# Patient Record
Sex: FEMALE | Race: WHITE | Employment: UNEMPLOYED | URBAN - METROPOLITAN AREA
[De-identification: names, ages, dates, MRNs, and addresses within clinical notes are randomized per-mention and may not be internally consistent; named-entity substitution may affect disease eponyms.]

---

## 2019-03-25 PROBLEM — N18.30 CHRONIC KIDNEY DISEASE, STAGE 3 (MODERATE): Status: ACTIVE | Noted: 2019-03-25

## 2019-03-26 PROBLEM — E78.2 MIXED HYPERLIPIDEMIA: Status: ACTIVE | Noted: 2019-03-26

## 2019-03-26 PROBLEM — Z79.4 TYPE 2 DIABETES MELLITUS WITH STAGE 1 CHRONIC KIDNEY DISEASE, WITH LONG-TERM CURRENT USE OF INSULIN (HCC): Status: ACTIVE | Noted: 2019-03-26

## 2019-03-26 PROBLEM — I10 ESSENTIAL HYPERTENSION: Status: ACTIVE | Noted: 2019-03-26

## 2019-03-26 PROBLEM — E11.22 TYPE 2 DIABETES MELLITUS WITH STAGE 1 CHRONIC KIDNEY DISEASE, WITH LONG-TERM CURRENT USE OF INSULIN (HCC): Status: ACTIVE | Noted: 2019-03-26

## 2019-03-26 PROBLEM — E03.9 HYPOTHYROIDISM: Status: ACTIVE | Noted: 2019-03-26

## 2019-03-26 PROBLEM — N18.1 TYPE 2 DIABETES MELLITUS WITH STAGE 1 CHRONIC KIDNEY DISEASE, WITH LONG-TERM CURRENT USE OF INSULIN (HCC): Status: ACTIVE | Noted: 2019-03-26

## 2019-03-26 PROBLEM — N39.3 STRESS INCONTINENCE: Status: ACTIVE | Noted: 2019-03-26

## 2020-04-13 PROBLEM — L03.90 CELLULITIS: Status: ACTIVE | Noted: 2020-04-06

## 2020-05-08 PROBLEM — T24.732A: Status: ACTIVE | Noted: 2020-05-08

## 2020-05-14 ENCOUNTER — APPOINTMENT (OUTPATIENT)
Dept: WOUND CARE | Facility: HOSPITAL | Age: 65
End: 2020-05-14
Attending: NURSE PRACTITIONER

## 2021-03-17 DIAGNOSIS — Z23 NEED FOR VACCINATION: ICD-10-CM

## 2021-05-20 PROBLEM — H91.92 HEARING LOSS OF LEFT EAR: Status: ACTIVE | Noted: 2021-05-20

## 2023-05-02 ENCOUNTER — APPOINTMENT (OUTPATIENT)
Dept: CT IMAGING | Facility: HOSPITAL | Age: 68
End: 2023-05-02
Attending: EMERGENCY MEDICINE
Payer: MEDICARE

## 2023-05-02 ENCOUNTER — APPOINTMENT (OUTPATIENT)
Dept: INTERVENTIONAL RADIOLOGY/VASCULAR | Facility: HOSPITAL | Age: 68
End: 2023-05-02
Attending: INTERNAL MEDICINE
Payer: MEDICARE

## 2023-05-02 ENCOUNTER — HOSPITAL ENCOUNTER (INPATIENT)
Facility: HOSPITAL | Age: 68
LOS: 8 days | Discharge: HOME | End: 2023-05-10
Attending: EMERGENCY MEDICINE
Payer: MEDICARE

## 2023-05-02 ENCOUNTER — HOSPITAL ENCOUNTER (INPATIENT)
Facility: HOSPITAL | Age: 68
LOS: 8 days | Discharge: HOME | End: 2023-05-10
Attending: EMERGENCY MEDICINE | Admitting: EMERGENCY MEDICINE
Payer: MEDICARE

## 2023-05-02 DIAGNOSIS — I21.4 NSTEMI (NON-ST ELEVATED MYOCARDIAL INFARCTION) (HCC): Primary | ICD-10-CM

## 2023-05-02 LAB
ALBUMIN SERPL-MCNC: 3.9 G/DL (ref 3.4–5)
ALP LIVER SERPL-CCNC: 127 U/L
ALT SERPL-CCNC: 26 U/L
ANION GAP SERPL CALC-SCNC: 12 MMOL/L (ref 0–18)
APTT PPP: 23.9 SECONDS (ref 23.3–35.6)
AST SERPL-CCNC: 43 U/L (ref 15–37)
ATRIAL RATE: 88 BPM
ATRIAL RATE: 94 BPM
ATRIAL RATE: 95 BPM
BASOPHILS # BLD AUTO: 0.04 X10(3) UL (ref 0–0.2)
BASOPHILS NFR BLD AUTO: 0.3 %
BILIRUB DIRECT SERPL-MCNC: 0.4 MG/DL (ref 0–0.2)
BILIRUB SERPL-MCNC: 0.8 MG/DL (ref 0.1–2)
BUN BLD-MCNC: 27 MG/DL (ref 7–18)
BUN/CREAT SERPL: 14.9 (ref 10–20)
CALCIUM BLD-MCNC: 9.6 MG/DL (ref 8.5–10.1)
CHLORIDE SERPL-SCNC: 106 MMOL/L (ref 98–112)
CHOLEST SERPL-MCNC: 98 MG/DL (ref ?–200)
CO2 SERPL-SCNC: 20 MMOL/L (ref 21–32)
CREAT BLD-MCNC: 1.81 MG/DL
DEPRECATED RDW RBC AUTO: 41.6 FL (ref 35.1–46.3)
EOSINOPHIL # BLD AUTO: 0.05 X10(3) UL (ref 0–0.7)
EOSINOPHIL NFR BLD AUTO: 0.4 %
ERYTHROCYTE [DISTWIDTH] IN BLOOD BY AUTOMATED COUNT: 13.1 % (ref 11–15)
EST. AVERAGE GLUCOSE BLD GHB EST-MCNC: 180 MG/DL (ref 68–126)
GFR SERPLBLD BASED ON 1.73 SQ M-ARVRAT: 31 ML/MIN/1.73M2 (ref 60–?)
GLUCOSE BLD-MCNC: 190 MG/DL (ref 70–99)
GLUCOSE BLDC GLUCOMTR-MCNC: 125 MG/DL (ref 70–99)
GLUCOSE BLDC GLUCOMTR-MCNC: 217 MG/DL (ref 70–99)
GLUCOSE BLDC GLUCOMTR-MCNC: 236 MG/DL (ref 70–99)
HBA1C MFR BLD: 7.9 % (ref ?–5.7)
HCT VFR BLD AUTO: 46.1 %
HDLC SERPL-MCNC: 35 MG/DL (ref 40–59)
HGB BLD-MCNC: 14.7 G/DL
IMM GRANULOCYTES # BLD AUTO: 0.07 X10(3) UL (ref 0–1)
IMM GRANULOCYTES NFR BLD: 0.5 %
ISTAT ACTIVATED CLOTTING TIME: 281 SECONDS (ref 125–137)
ISTAT ACTIVATED CLOTTING TIME: 305 SECONDS (ref 125–137)
LDLC SERPL CALC-MCNC: 41 MG/DL (ref ?–100)
LIPASE SERPL-CCNC: 43 U/L (ref 13–75)
LYMPHOCYTES # BLD AUTO: 1.52 X10(3) UL (ref 1–4)
LYMPHOCYTES NFR BLD AUTO: 11.9 %
MCH RBC QN AUTO: 27.7 PG (ref 26–34)
MCHC RBC AUTO-ENTMCNC: 31.9 G/DL (ref 31–37)
MCV RBC AUTO: 87 FL
MONOCYTES # BLD AUTO: 0.82 X10(3) UL (ref 0.1–1)
MONOCYTES NFR BLD AUTO: 6.4 %
NEUTROPHILS # BLD AUTO: 10.3 X10 (3) UL (ref 1.5–7.7)
NEUTROPHILS # BLD AUTO: 10.3 X10(3) UL (ref 1.5–7.7)
NEUTROPHILS NFR BLD AUTO: 80.5 %
NONHDLC SERPL-MCNC: 63 MG/DL (ref ?–130)
OSMOLALITY SERPL CALC.SUM OF ELEC: 296 MOSM/KG (ref 275–295)
P AXIS: 52 DEGREES
P AXIS: 58 DEGREES
P AXIS: 62 DEGREES
P-R INTERVAL: 192 MS
P-R INTERVAL: 192 MS
P-R INTERVAL: 200 MS
PLATELET # BLD AUTO: 237 10(3)UL (ref 150–450)
POTASSIUM SERPL-SCNC: 3.8 MMOL/L (ref 3.5–5.1)
PROT SERPL-MCNC: 8.3 G/DL (ref 6.4–8.2)
Q-T INTERVAL: 318 MS
Q-T INTERVAL: 340 MS
Q-T INTERVAL: 376 MS
QRS DURATION: 68 MS
QRS DURATION: 78 MS
QRS DURATION: 82 MS
QTC CALCULATION (BEZET): 399 MS
QTC CALCULATION (BEZET): 425 MS
QTC CALCULATION (BEZET): 454 MS
R AXIS: -22 DEGREES
R AXIS: -8 DEGREES
R AXIS: -9 DEGREES
RBC # BLD AUTO: 5.3 X10(6)UL
SODIUM SERPL-SCNC: 138 MMOL/L (ref 136–145)
T AXIS: -2 DEGREES
T AXIS: -49 DEGREES
T AXIS: 2 DEGREES
T4 FREE SERPL-MCNC: 2.7 NG/DL (ref 0.8–1.7)
TRIGL SERPL-MCNC: 121 MG/DL (ref 30–149)
TROPONIN I HIGH SENSITIVITY: 8080 NG/L
TROPONIN I HIGH SENSITIVITY: 8627 NG/L
TROPONIN I HIGH SENSITIVITY: ABNORMAL NG/L
TROPONIN I HIGH SENSITIVITY: ABNORMAL NG/L
TSI SER-ACNC: <0.005 MIU/ML (ref 0.36–3.74)
VENTRICULAR RATE: 88 BPM
VENTRICULAR RATE: 94 BPM
VENTRICULAR RATE: 95 BPM
VLDLC SERPL CALC-MCNC: 17 MG/DL (ref 0–30)
WBC # BLD AUTO: 12.8 X10(3) UL (ref 4–11)

## 2023-05-02 PROCEDURE — 74177 CT ABD & PELVIS W/CONTRAST: CPT | Performed by: EMERGENCY MEDICINE

## 2023-05-02 PROCEDURE — 0271366 DILATION OF CORONARY ARTERY, TWO ARTERIES, BIFURCATION, WITH THREE DRUG-ELUTING INTRALUMINAL DEVICES, PERCUTANEOUS APPROACH: ICD-10-PCS | Performed by: INTERNAL MEDICINE

## 2023-05-02 PROCEDURE — 99223 1ST HOSP IP/OBS HIGH 75: CPT | Performed by: INTERNAL MEDICINE

## 2023-05-02 PROCEDURE — B2111ZZ FLUOROSCOPY OF MULTIPLE CORONARY ARTERIES USING LOW OSMOLAR CONTRAST: ICD-10-PCS | Performed by: INTERNAL MEDICINE

## 2023-05-02 PROCEDURE — B2151ZZ FLUOROSCOPY OF LEFT HEART USING LOW OSMOLAR CONTRAST: ICD-10-PCS | Performed by: INTERNAL MEDICINE

## 2023-05-02 PROCEDURE — 71260 CT THORAX DX C+: CPT | Performed by: EMERGENCY MEDICINE

## 2023-05-02 PROCEDURE — B240ZZ3 ULTRASONOGRAPHY OF SINGLE CORONARY ARTERY, INTRAVASCULAR: ICD-10-PCS | Performed by: INTERNAL MEDICINE

## 2023-05-02 PROCEDURE — 4A023N7 MEASUREMENT OF CARDIAC SAMPLING AND PRESSURE, LEFT HEART, PERCUTANEOUS APPROACH: ICD-10-PCS | Performed by: INTERNAL MEDICINE

## 2023-05-02 RX ORDER — ENEMA 19; 7 G/133ML; G/133ML
1 ENEMA RECTAL ONCE AS NEEDED
Status: DISCONTINUED | OUTPATIENT
Start: 2023-05-02 | End: 2023-05-10

## 2023-05-02 RX ORDER — PRAVASTATIN SODIUM 10 MG
10 TABLET ORAL NIGHTLY
Status: DISCONTINUED | OUTPATIENT
Start: 2023-05-02 | End: 2023-05-02

## 2023-05-02 RX ORDER — MORPHINE SULFATE 2 MG/ML
1 INJECTION, SOLUTION INTRAMUSCULAR; INTRAVENOUS EVERY 2 HOUR PRN
Status: DISCONTINUED | OUTPATIENT
Start: 2023-05-02 | End: 2023-05-10

## 2023-05-02 RX ORDER — SENNOSIDES 8.6 MG
17.2 TABLET ORAL NIGHTLY PRN
Status: DISCONTINUED | OUTPATIENT
Start: 2023-05-02 | End: 2023-05-10

## 2023-05-02 RX ORDER — NICOTINE POLACRILEX 4 MG
15 LOZENGE BUCCAL
Status: DISCONTINUED | OUTPATIENT
Start: 2023-05-02 | End: 2023-05-10

## 2023-05-02 RX ORDER — ONDANSETRON 2 MG/ML
INJECTION INTRAMUSCULAR; INTRAVENOUS
Status: COMPLETED
Start: 2023-05-02 | End: 2023-05-02

## 2023-05-02 RX ORDER — MIDAZOLAM HYDROCHLORIDE 1 MG/ML
INJECTION INTRAMUSCULAR; INTRAVENOUS
Status: COMPLETED
Start: 2023-05-02 | End: 2023-05-02

## 2023-05-02 RX ORDER — OXYBUTYNIN CHLORIDE 5 MG/1
5 TABLET, EXTENDED RELEASE ORAL DAILY
Status: DISCONTINUED | OUTPATIENT
Start: 2023-05-02 | End: 2023-05-10

## 2023-05-02 RX ORDER — ONDANSETRON 2 MG/ML
4 INJECTION INTRAMUSCULAR; INTRAVENOUS EVERY 6 HOURS PRN
Status: DISCONTINUED | OUTPATIENT
Start: 2023-05-02 | End: 2023-05-08

## 2023-05-02 RX ORDER — HEPARIN SODIUM AND DEXTROSE 10000; 5 [USP'U]/100ML; G/100ML
12 INJECTION INTRAVENOUS ONCE
Status: COMPLETED | OUTPATIENT
Start: 2023-05-02 | End: 2023-05-02

## 2023-05-02 RX ORDER — MORPHINE SULFATE 4 MG/ML
4 INJECTION, SOLUTION INTRAMUSCULAR; INTRAVENOUS ONCE
Status: DISCONTINUED | OUTPATIENT
Start: 2023-05-02 | End: 2023-05-03

## 2023-05-02 RX ORDER — MECLIZINE HCL 12.5 MG/1
25 TABLET ORAL 3 TIMES DAILY PRN
Status: DISCONTINUED | OUTPATIENT
Start: 2023-05-02 | End: 2023-05-10

## 2023-05-02 RX ORDER — FAMOTIDINE 10 MG/ML
20 INJECTION, SOLUTION INTRAVENOUS ONCE
Status: COMPLETED | OUTPATIENT
Start: 2023-05-02 | End: 2023-05-02

## 2023-05-02 RX ORDER — CLONIDINE HYDROCHLORIDE 0.3 MG/1
0.6 TABLET ORAL 2 TIMES DAILY
Status: DISCONTINUED | OUTPATIENT
Start: 2023-05-02 | End: 2023-05-03

## 2023-05-02 RX ORDER — MELATONIN
3 NIGHTLY PRN
Status: DISCONTINUED | OUTPATIENT
Start: 2023-05-02 | End: 2023-05-10

## 2023-05-02 RX ORDER — POTASSIUM CHLORIDE 20 MEQ/1
40 TABLET, EXTENDED RELEASE ORAL ONCE
Status: COMPLETED | OUTPATIENT
Start: 2023-05-02 | End: 2023-05-02

## 2023-05-02 RX ORDER — TRAMADOL HYDROCHLORIDE 50 MG/1
50 TABLET ORAL EVERY 12 HOURS PRN
Status: DISCONTINUED | OUTPATIENT
Start: 2023-05-02 | End: 2023-05-10

## 2023-05-02 RX ORDER — MORPHINE SULFATE 2 MG/ML
2 INJECTION, SOLUTION INTRAMUSCULAR; INTRAVENOUS EVERY 2 HOUR PRN
Status: DISCONTINUED | OUTPATIENT
Start: 2023-05-02 | End: 2023-05-10

## 2023-05-02 RX ORDER — DEXTROSE MONOHYDRATE 25 G/50ML
50 INJECTION, SOLUTION INTRAVENOUS
Status: DISCONTINUED | OUTPATIENT
Start: 2023-05-02 | End: 2023-05-10

## 2023-05-02 RX ORDER — ONDANSETRON HYDROCHLORIDE 8 MG/1
8 TABLET, FILM COATED ORAL EVERY 8 HOURS PRN
Status: DISCONTINUED | OUTPATIENT
Start: 2023-05-02 | End: 2023-05-10

## 2023-05-02 RX ORDER — FENOFIBRATE 67 MG/1
67 CAPSULE ORAL
Status: DISCONTINUED | OUTPATIENT
Start: 2023-05-02 | End: 2023-05-10

## 2023-05-02 RX ORDER — NITROGLYCERIN 20 MG/100ML
INJECTION INTRAVENOUS
Status: COMPLETED
Start: 2023-05-02 | End: 2023-05-02

## 2023-05-02 RX ORDER — PANTOPRAZOLE SODIUM 20 MG/1
20 TABLET, DELAYED RELEASE ORAL
Status: DISCONTINUED | OUTPATIENT
Start: 2023-05-02 | End: 2023-05-06

## 2023-05-02 RX ORDER — VERAPAMIL HYDROCHLORIDE 2.5 MG/ML
INJECTION, SOLUTION INTRAVENOUS
Status: COMPLETED
Start: 2023-05-02 | End: 2023-05-02

## 2023-05-02 RX ORDER — MORPHINE SULFATE 4 MG/ML
4 INJECTION, SOLUTION INTRAMUSCULAR; INTRAVENOUS EVERY 2 HOUR PRN
Status: DISCONTINUED | OUTPATIENT
Start: 2023-05-02 | End: 2023-05-10

## 2023-05-02 RX ORDER — POLYETHYLENE GLYCOL 3350 17 G/17G
17 POWDER, FOR SOLUTION ORAL DAILY PRN
Status: DISCONTINUED | OUTPATIENT
Start: 2023-05-02 | End: 2023-05-09

## 2023-05-02 RX ORDER — BISACODYL 10 MG
10 SUPPOSITORY, RECTAL RECTAL
Status: DISCONTINUED | OUTPATIENT
Start: 2023-05-02 | End: 2023-05-10

## 2023-05-02 RX ORDER — ONDANSETRON 2 MG/ML
4 INJECTION INTRAMUSCULAR; INTRAVENOUS ONCE
Status: COMPLETED | OUTPATIENT
Start: 2023-05-02 | End: 2023-05-02

## 2023-05-02 RX ORDER — LIDOCAINE HYDROCHLORIDE 20 MG/ML
INJECTION, SOLUTION EPIDURAL; INFILTRATION; INTRACAUDAL; PERINEURAL
Status: COMPLETED
Start: 2023-05-02 | End: 2023-05-02

## 2023-05-02 RX ORDER — GUAIFENESIN 600 MG/1
600 TABLET, EXTENDED RELEASE ORAL 2 TIMES DAILY
Status: DISCONTINUED | OUTPATIENT
Start: 2023-05-02 | End: 2023-05-10

## 2023-05-02 RX ORDER — GABAPENTIN 300 MG/1
300 CAPSULE ORAL 3 TIMES DAILY
Status: DISCONTINUED | OUTPATIENT
Start: 2023-05-02 | End: 2023-05-10

## 2023-05-02 RX ORDER — HYDRALAZINE HYDROCHLORIDE 20 MG/ML
15 INJECTION INTRAMUSCULAR; INTRAVENOUS ONCE
Status: DISCONTINUED | OUTPATIENT
Start: 2023-05-02 | End: 2023-05-02

## 2023-05-02 RX ORDER — ASPIRIN 81 MG/1
81 TABLET ORAL DAILY
Status: DISCONTINUED | OUTPATIENT
Start: 2023-05-03 | End: 2023-05-02

## 2023-05-02 RX ORDER — NICOTINE POLACRILEX 4 MG
30 LOZENGE BUCCAL
Status: DISCONTINUED | OUTPATIENT
Start: 2023-05-02 | End: 2023-05-10

## 2023-05-02 RX ORDER — LISINOPRIL 20 MG/1
20 TABLET ORAL DAILY
Status: DISCONTINUED | OUTPATIENT
Start: 2023-05-02 | End: 2023-05-02

## 2023-05-02 RX ORDER — NITROGLYCERIN 0.4 MG/1
0.4 TABLET SUBLINGUAL EVERY 5 MIN PRN
Status: DISCONTINUED | OUTPATIENT
Start: 2023-05-02 | End: 2023-05-10

## 2023-05-02 RX ORDER — SODIUM CHLORIDE 9 MG/ML
INJECTION, SOLUTION INTRAVENOUS CONTINUOUS
Status: ACTIVE | OUTPATIENT
Start: 2023-05-02 | End: 2023-05-02

## 2023-05-02 RX ORDER — LEVOTHYROXINE SODIUM 0.1 MG/1
200 TABLET ORAL
Status: DISCONTINUED | OUTPATIENT
Start: 2023-05-02 | End: 2023-05-10

## 2023-05-02 RX ORDER — ROSUVASTATIN CALCIUM 20 MG/1
40 TABLET, COATED ORAL NIGHTLY
Status: DISCONTINUED | OUTPATIENT
Start: 2023-05-02 | End: 2023-05-10

## 2023-05-02 RX ORDER — ONDANSETRON 4 MG/1
4 TABLET, FILM COATED ORAL EVERY 8 HOURS PRN
Status: DISCONTINUED | OUTPATIENT
Start: 2023-05-02 | End: 2023-05-10

## 2023-05-02 RX ORDER — SODIUM CHLORIDE 9 MG/ML
INJECTION, SOLUTION INTRAVENOUS CONTINUOUS
Status: DISCONTINUED | OUTPATIENT
Start: 2023-05-02 | End: 2023-05-03

## 2023-05-02 RX ORDER — ASPIRIN 81 MG/1
324 TABLET, CHEWABLE ORAL ONCE
Status: COMPLETED | OUTPATIENT
Start: 2023-05-02 | End: 2023-05-02

## 2023-05-02 RX ORDER — ASPIRIN 325 MG
325 TABLET ORAL DAILY
Status: DISCONTINUED | OUTPATIENT
Start: 2023-05-02 | End: 2023-05-03

## 2023-05-02 RX ORDER — HEPARIN SODIUM 1000 [USP'U]/ML
INJECTION, SOLUTION INTRAVENOUS; SUBCUTANEOUS
Status: COMPLETED
Start: 2023-05-02 | End: 2023-05-02

## 2023-05-02 RX ORDER — HEPARIN SODIUM 1000 [USP'U]/ML
60 INJECTION, SOLUTION INTRAVENOUS; SUBCUTANEOUS ONCE
Status: COMPLETED | OUTPATIENT
Start: 2023-05-02 | End: 2023-05-02

## 2023-05-02 RX ORDER — METOCLOPRAMIDE HYDROCHLORIDE 5 MG/ML
5 INJECTION INTRAMUSCULAR; INTRAVENOUS EVERY 8 HOURS PRN
Status: DISCONTINUED | OUTPATIENT
Start: 2023-05-02 | End: 2023-05-08

## 2023-05-02 NOTE — ED QUICK NOTES
Orders for admission, patient is aware of plan and ready to go upstairs. Any questions, please call ED RN Jeremy at extension [phone].      Patient Covid vaccination status: Fully vaccinated     COVID Test Ordered in ED: None    COVID Suspicion at Admission: N/A    Running Infusions:      Mental Status/LOC at time of transport: A&OX3    Other pertinent information:   CIWA score: N/A   NIH score:  N/A

## 2023-05-02 NOTE — PLAN OF CARE
Called to bedside by RN due to right radial site bleeding. Per RN she had been holding pressure 20 minute prior to my arrival. Site assessed, neurovascularly intact. Applied manual pressure. TR band representative brought new band to bedside, upon application patient started to develop a hematoma. Band was removed and manual pressure reapplied with resolution of hematoma, no bleeding to site. Occlusive dressing applied.

## 2023-05-02 NOTE — ED QUICK NOTES
Pt resting on cart in position of comfort, pt remains on monitor, pt reports no pain at this time. Call light within reach.

## 2023-05-02 NOTE — ED QUICK NOTES
Care taken over at this time, pt resting on cart in position of comfort reports feels a lot better,  at bedside, call light within reach.

## 2023-05-02 NOTE — ED QUICK NOTES
Pt back from ct reports center chest pain only present when she pushes on it rates 1/10 at this time, pt aware of poc updated using an video , remains on monitor, family at bedside, call light within reach.

## 2023-05-02 NOTE — PROCEDURES
[facility]    Cardiac Cath Procedure Note  Brenton Peñaloza Patient Status:  Emergency    1955 MRN [id number]   Location [address] Attending Richy Bassett MD   Hosp Day # 0 PCP Joselin Valenzuela MD       Cardiologist: Red Gardiner MD  Primary Proceduralist: Red Gardiner MD  Procedure Performed: LHC, LV and IVUS guided PCI LAD-D bifurcation  Date of Procedure: 2023   Indication: STEMI    Summary of procedure:   Successful IVUS guided PCI LAD-diagonal bifurcation, culprit likely ostium of the diagonal, significant plaque burden in the LAD as well. Elevated LVEDP 20 mmHg in the setting of XENA creatinine 1.8. No fluids given however will need diuretics. 80 cc of dye used, no LV gram.      Assessment:  CAD: Diffuse distal circumflex disease medically managed, distal LAD disease medically managed. Culprit proximal LAD-diagonal bifurcation status post PCI ASHA x3.    HFpEF: Presumably normal ejection fraction, LVEDP 20 mmHg    XENA on CKD: Likely secondary to hypoperfusion from MI, volume overloaded    Risk factors: Hypertension, hyperlipidemia, diabetes      Recommendations:  Echocardiogram  DAPT: Aspirin and Brilinta  Switch simvastatin for Crestor 40  No diuretics today, follow urine output and creatinine, reassess tomorrow  Restart home medications per primary      Left Ventriculography and hemodynamics:   LV EF not done  LV EDP 20 mmHg  No gradient across aortic valve        Coronary Angiography  RCA:  Dominant and free of obstructive disease, supplies PDA and PL    Left main:  Free of obstructive disease    Left anterior descendin% proximal LAD stenosis at the bifurcation of first diagonal which is subtotaled 95% with ENOC I flow. Mid LAD without obstructive disease, apical LAD 95% stenosis and tapers quickly.       Circumflex: Supplies large OM to the inferolateral wall which is free of obstructive disease, distal circumflex with diffuse disease and small vessel. LAD-diagonal intervention: [place] intervention stenting the diagonal first, rewiring and stenting the LAD afterwards. Kissing balloon inflation, optimized with IVUS guidance. Diagonal intervention  Lesion Characteristics-severely torturous, not calcified. Type non-C lesion. Pre-intervention stenosis 95%, Post intervention stenosis 0%. Pre ENOC 1, Post ENOC 3.      Guide Catheter: EBU 3.75  Wire: Lina blue and Lina black, wires in the LAD and diagonal depending on stage of the case, used Lina black to rewire through stent struts. Pre-dil: None  Stent: 2.5 x 16 mm Synergy ASHA  Post-dil: 3 x 8 mm compliant balloon proximal portion of the stent to allow rewiring back down the LAD  Kissing balloon inflation once both the LAD and the diagonal were stented with 2.5 mm balloon in the diagonal and 3.5 mm balloon in the LAD      LAD intervention  Lesion Characteristics-not torturous, mildly calcified. Type non-C lesion. Pre-intervention stenosis 80%, Post intervention stenosis 0%. Pre ENOC 3, Post ENOC 3.      Guide Catheter: EBU 3.75  Wire: Lina blue and Lina black, wires in the LAD and diagonal depending on stage of the case, used Lina black to rewire through stent struts.   Pre-dil: Diagonal stented first, jailing the LAD, stent struts were predilated with 2.5 x 20 mm balloon  Stent: 3 x 30 mm Synergy ASHA in the proximal LAD  Post-dil: Postdilated with 3.5 x 20 mm compliant balloon  Rewired diagonal with no Lina black wire, kissing balloon inflation 2.5 mm balloon and diagonal, 3.5 mm balloon in LAD  Intravascular ultrasound: Demonstrated slightly undersized LAD portion of the stent  Post dilation: 3 x 20 mm compliant balloon at 18 jluis and distal portion of the stent, 3.5 mm compliant balloon at 18 jluis proximal portion of the stent  Distal vessel stent edge dissection after post dilation covered with 3 x 8 mm Synergy ASHA  Component of wire bias therefore once pulling the wire back the soft portion of the wire distal flow and angiographic appearance much improved. Summary of Case: After written informed consent was obtained from the patient, patient was brought to the cardiac catheterization laboratory. Patient was prepped and draped in the usual sterile fashion. Lidocaine 1% was used to infiltrate the right radial artery for local anesthesia and a 6 Hungarian introducer sheath was inserted into the right radial artery. Selective coronary angiography performed with JR4 catheter for RCA and JL3.5 catheter for LCA. Angiography performed in standard projections. 6 Samoan JR4 catheter placed in LV for hemodynamics. Specimen sent to: No specimen collected  Estimated blood loss: 10 cc  Closure:  TR band      IV was maintained by RN and moderate conscious sedation of versed and fentanyl was given. Patient was assessed and monitoring of oxygen, heart rate and blood pressure by nurse and myself during the exam 60 minutes.       Danyelle Campos MD  05/02/23

## 2023-05-02 NOTE — PROGRESS NOTES
Post midnight follow up    Patient admitted for acute NSTEMI    Status post PCI  Had some bleeding at the right radial site which now resolved. Monitor in PCU. Will resume care in AM.    Discussed care with nursing.     Sandee Cristina MD

## 2023-05-03 ENCOUNTER — APPOINTMENT (OUTPATIENT)
Dept: CV DIAGNOSTICS | Facility: HOSPITAL | Age: 68
End: 2023-05-03
Attending: NURSE PRACTITIONER
Payer: MEDICARE

## 2023-05-03 LAB
ANION GAP SERPL CALC-SCNC: 5 MMOL/L (ref 0–18)
ANION GAP SERPL CALC-SCNC: 6 MMOL/L (ref 0–18)
BASOPHILS # BLD AUTO: 0.04 X10(3) UL (ref 0–0.2)
BASOPHILS # BLD AUTO: 0.05 X10(3) UL (ref 0–0.2)
BASOPHILS NFR BLD AUTO: 0.5 %
BASOPHILS NFR BLD AUTO: 0.6 %
BUN BLD-MCNC: 23 MG/DL (ref 7–18)
BUN BLD-MCNC: 23 MG/DL (ref 7–18)
BUN/CREAT SERPL: 14.1 (ref 10–20)
BUN/CREAT SERPL: 14.1 (ref 10–20)
CALCIUM BLD-MCNC: 8.8 MG/DL (ref 8.5–10.1)
CALCIUM BLD-MCNC: 8.8 MG/DL (ref 8.5–10.1)
CHLORIDE SERPL-SCNC: 112 MMOL/L (ref 98–112)
CHLORIDE SERPL-SCNC: 112 MMOL/L (ref 98–112)
CO2 SERPL-SCNC: 22 MMOL/L (ref 21–32)
CO2 SERPL-SCNC: 22 MMOL/L (ref 21–32)
CREAT BLD-MCNC: 1.63 MG/DL
CREAT BLD-MCNC: 1.63 MG/DL
DEPRECATED RDW RBC AUTO: 43.6 FL (ref 35.1–46.3)
DEPRECATED RDW RBC AUTO: 43.6 FL (ref 35.1–46.3)
EOSINOPHIL # BLD AUTO: 0.27 X10(3) UL (ref 0–0.7)
EOSINOPHIL # BLD AUTO: 0.28 X10(3) UL (ref 0–0.7)
EOSINOPHIL NFR BLD AUTO: 3.4 %
EOSINOPHIL NFR BLD AUTO: 3.5 %
ERYTHROCYTE [DISTWIDTH] IN BLOOD BY AUTOMATED COUNT: 13.4 % (ref 11–15)
ERYTHROCYTE [DISTWIDTH] IN BLOOD BY AUTOMATED COUNT: 13.5 % (ref 11–15)
GFR SERPLBLD BASED ON 1.73 SQ M-ARVRAT: 35 ML/MIN/1.73M2 (ref 60–?)
GFR SERPLBLD BASED ON 1.73 SQ M-ARVRAT: 35 ML/MIN/1.73M2 (ref 60–?)
GLUCOSE BLD-MCNC: 133 MG/DL (ref 70–99)
GLUCOSE BLD-MCNC: 137 MG/DL (ref 70–99)
GLUCOSE BLDC GLUCOMTR-MCNC: 103 MG/DL (ref 70–99)
GLUCOSE BLDC GLUCOMTR-MCNC: 168 MG/DL (ref 70–99)
GLUCOSE BLDC GLUCOMTR-MCNC: 213 MG/DL (ref 70–99)
HCT VFR BLD AUTO: 40 %
HCT VFR BLD AUTO: 40.4 %
HCT VFR BLD AUTO: 41.7 %
HGB BLD-MCNC: 12.7 G/DL
HGB BLD-MCNC: 12.8 G/DL
HGB BLD-MCNC: 13.3 G/DL
IMM GRANULOCYTES # BLD AUTO: 0.03 X10(3) UL (ref 0–1)
IMM GRANULOCYTES # BLD AUTO: 0.04 X10(3) UL (ref 0–1)
IMM GRANULOCYTES NFR BLD: 0.4 %
IMM GRANULOCYTES NFR BLD: 0.5 %
LYMPHOCYTES # BLD AUTO: 2.32 X10(3) UL (ref 1–4)
LYMPHOCYTES # BLD AUTO: 2.35 X10(3) UL (ref 1–4)
LYMPHOCYTES NFR BLD AUTO: 29.3 %
LYMPHOCYTES NFR BLD AUTO: 29.6 %
MCH RBC QN AUTO: 28 PG (ref 26–34)
MCH RBC QN AUTO: 28.2 PG (ref 26–34)
MCHC RBC AUTO-ENTMCNC: 31.7 G/DL (ref 31–37)
MCHC RBC AUTO-ENTMCNC: 31.8 G/DL (ref 31–37)
MCV RBC AUTO: 88.4 FL
MCV RBC AUTO: 88.7 FL
MONOCYTES # BLD AUTO: 0.95 X10(3) UL (ref 0.1–1)
MONOCYTES # BLD AUTO: 0.97 X10(3) UL (ref 0.1–1)
MONOCYTES NFR BLD AUTO: 12 %
MONOCYTES NFR BLD AUTO: 12.2 %
NEUTROPHILS # BLD AUTO: 4.26 X10 (3) UL (ref 1.5–7.7)
NEUTROPHILS # BLD AUTO: 4.26 X10(3) UL (ref 1.5–7.7)
NEUTROPHILS # BLD AUTO: 4.29 X10 (3) UL (ref 1.5–7.7)
NEUTROPHILS # BLD AUTO: 4.29 X10(3) UL (ref 1.5–7.7)
NEUTROPHILS NFR BLD AUTO: 53.8 %
NEUTROPHILS NFR BLD AUTO: 54.2 %
OSMOLALITY SERPL CALC.SUM OF ELEC: 294 MOSM/KG (ref 275–295)
OSMOLALITY SERPL CALC.SUM OF ELEC: 296 MOSM/KG (ref 275–295)
PLATELET # BLD AUTO: 189 10(3)UL (ref 150–450)
PLATELET # BLD AUTO: 192 10(3)UL (ref 150–450)
POTASSIUM SERPL-SCNC: 4.5 MMOL/L (ref 3.5–5.1)
POTASSIUM SERPL-SCNC: 4.6 MMOL/L (ref 3.5–5.1)
RBC # BLD AUTO: 4.51 X10(6)UL
RBC # BLD AUTO: 4.57 X10(6)UL
SODIUM SERPL-SCNC: 139 MMOL/L (ref 136–145)
SODIUM SERPL-SCNC: 140 MMOL/L (ref 136–145)
WBC # BLD AUTO: 7.9 X10(3) UL (ref 4–11)
WBC # BLD AUTO: 7.9 X10(3) UL (ref 4–11)

## 2023-05-03 PROCEDURE — 93306 TTE W/DOPPLER COMPLETE: CPT | Performed by: NURSE PRACTITIONER

## 2023-05-03 PROCEDURE — 99232 SBSQ HOSP IP/OBS MODERATE 35: CPT | Performed by: INTERNAL MEDICINE

## 2023-05-03 RX ORDER — LISINOPRIL 20 MG/1
20 TABLET ORAL DAILY
Status: DISCONTINUED | OUTPATIENT
Start: 2023-05-03 | End: 2023-05-06

## 2023-05-03 RX ORDER — LISINOPRIL 20 MG/1
20 TABLET ORAL DAILY
Status: DISCONTINUED | OUTPATIENT
Start: 2023-05-03 | End: 2023-05-03

## 2023-05-03 RX ORDER — METOPROLOL SUCCINATE 25 MG/1
25 TABLET, EXTENDED RELEASE ORAL
Status: DISCONTINUED | OUTPATIENT
Start: 2023-05-03 | End: 2023-05-04

## 2023-05-03 RX ORDER — ASPIRIN 81 MG/1
81 TABLET ORAL DAILY
Status: DISCONTINUED | OUTPATIENT
Start: 2023-05-04 | End: 2023-05-10

## 2023-05-03 NOTE — PLAN OF CARE
Deep Melo Patient Status:  Inpatient    1955 MRN [id number]   Location [facility] 2W/SW Attending Juan Carlos Brown MD   Hosp Day # 1 PCP Yves Kathleen MD     Cardiology Nocturnal APN Plan of Care     Page Received: Bedside RN [id number]    Patient s/p PCI LAD and bleeding at puncture site now with hypotension, asymptomatic. Per RN, no further bleeding at puncture site, no hematoma present.  BP 78/53 HR 91       Assessment/Plan:   - Hold clonidine  - 250 ml NS bolus x 1 now  - AM cbc / chemistry   - Echo pending     Addendum 0309  - BP 68/51, HR 90  - Send AM labs now  - 250 ml NS bolus     Nina Richey, [address]  5/3/2023  1:24 AM

## 2023-05-03 NOTE — PLAN OF CARE
Dana Drake Patient Status:  Inpatient    1955 MRN [id number]   Location [facility] 2W/SW Attending Kamilah Ibarra MD   Hosp Day # 1 PCP Danna Najjar, MD     Cardiology Nocturnal APN Plan of Care     Page Received: Bedside RN 0988    Patient s/p PCI previously with hypotension, clonidine d/c'd now with HTN and intermittent tachycardia started on BB today.        Assessment/Plan:   - Additional 12.5 PO metop xl x 1 and reassess HR and BP in 1 hour     Addendum 2140  Patient BP continues to increase,  per RN  - Check BP on both arms with new cuff  - Resume home lisinopril 20 mg       Sherie Hutchison, [address]  5/3/2023  6:49 PM

## 2023-05-04 ENCOUNTER — APPOINTMENT (OUTPATIENT)
Dept: CT IMAGING | Facility: HOSPITAL | Age: 68
End: 2023-05-04
Attending: INTERNAL MEDICINE
Payer: MEDICARE

## 2023-05-04 LAB
ANION GAP SERPL CALC-SCNC: 7 MMOL/L (ref 0–18)
BASOPHILS # BLD AUTO: 0.07 X10(3) UL (ref 0–0.2)
BASOPHILS NFR BLD AUTO: 0.6 %
BUN BLD-MCNC: 22 MG/DL (ref 7–18)
BUN/CREAT SERPL: 12.9 (ref 10–20)
CALCIUM BLD-MCNC: 9.6 MG/DL (ref 8.5–10.1)
CHLORIDE SERPL-SCNC: 110 MMOL/L (ref 98–112)
CO2 SERPL-SCNC: 24 MMOL/L (ref 21–32)
CREAT BLD-MCNC: 1.71 MG/DL
DEPRECATED RDW RBC AUTO: 42.1 FL (ref 35.1–46.3)
EOSINOPHIL # BLD AUTO: 0.26 X10(3) UL (ref 0–0.7)
EOSINOPHIL NFR BLD AUTO: 2.2 %
ERYTHROCYTE [DISTWIDTH] IN BLOOD BY AUTOMATED COUNT: 13.2 % (ref 11–15)
GFR SERPLBLD BASED ON 1.73 SQ M-ARVRAT: 33 ML/MIN/1.73M2 (ref 60–?)
GLUCOSE BLD-MCNC: 176 MG/DL (ref 70–99)
GLUCOSE BLDC GLUCOMTR-MCNC: 183 MG/DL (ref 70–99)
GLUCOSE BLDC GLUCOMTR-MCNC: 195 MG/DL (ref 70–99)
GLUCOSE BLDC GLUCOMTR-MCNC: 207 MG/DL (ref 70–99)
GLUCOSE BLDC GLUCOMTR-MCNC: 236 MG/DL (ref 70–99)
HCT VFR BLD AUTO: 45.9 %
HGB BLD-MCNC: 14.8 G/DL
IMM GRANULOCYTES # BLD AUTO: 0.04 X10(3) UL (ref 0–1)
IMM GRANULOCYTES NFR BLD: 0.3 %
LYMPHOCYTES # BLD AUTO: 2.54 X10(3) UL (ref 1–4)
LYMPHOCYTES NFR BLD AUTO: 21.7 %
MCH RBC QN AUTO: 28.2 PG (ref 26–34)
MCHC RBC AUTO-ENTMCNC: 32.2 G/DL (ref 31–37)
MCV RBC AUTO: 87.4 FL
MONOCYTES # BLD AUTO: 1.43 X10(3) UL (ref 0.1–1)
MONOCYTES NFR BLD AUTO: 12.2 %
NEUTROPHILS # BLD AUTO: 7.35 X10 (3) UL (ref 1.5–7.7)
NEUTROPHILS # BLD AUTO: 7.35 X10(3) UL (ref 1.5–7.7)
NEUTROPHILS NFR BLD AUTO: 63 %
OSMOLALITY SERPL CALC.SUM OF ELEC: 300 MOSM/KG (ref 275–295)
PLATELET # BLD AUTO: 210 10(3)UL (ref 150–450)
POTASSIUM SERPL-SCNC: 4.6 MMOL/L (ref 3.5–5.1)
RBC # BLD AUTO: 5.25 X10(6)UL
SODIUM SERPL-SCNC: 141 MMOL/L (ref 136–145)
WBC # BLD AUTO: 11.7 X10(3) UL (ref 4–11)

## 2023-05-04 PROCEDURE — 74176 CT ABD & PELVIS W/O CONTRAST: CPT | Performed by: INTERNAL MEDICINE

## 2023-05-04 PROCEDURE — 99232 SBSQ HOSP IP/OBS MODERATE 35: CPT | Performed by: INTERNAL MEDICINE

## 2023-05-04 RX ORDER — METOPROLOL SUCCINATE 50 MG/1
50 TABLET, EXTENDED RELEASE ORAL
Status: DISCONTINUED | OUTPATIENT
Start: 2023-05-04 | End: 2023-05-07

## 2023-05-04 RX ORDER — SENNA AND DOCUSATE SODIUM 50; 8.6 MG/1; MG/1
2 TABLET, FILM COATED ORAL
Status: DISCONTINUED | OUTPATIENT
Start: 2023-05-04 | End: 2023-05-10

## 2023-05-04 RX ORDER — HEPARIN SODIUM AND DEXTROSE 10000; 5 [USP'U]/100ML; G/100ML
INJECTION INTRAVENOUS CONTINUOUS
Status: DISCONTINUED | OUTPATIENT
Start: 2023-05-04 | End: 2023-05-04

## 2023-05-04 RX ORDER — HYDRALAZINE HYDROCHLORIDE 20 MG/ML
10 INJECTION INTRAMUSCULAR; INTRAVENOUS EVERY 6 HOURS PRN
Status: DISCONTINUED | OUTPATIENT
Start: 2023-05-04 | End: 2023-05-10

## 2023-05-04 RX ORDER — CLONIDINE HYDROCHLORIDE 0.1 MG/1
0.3 TABLET ORAL 2 TIMES DAILY
Status: DISCONTINUED | OUTPATIENT
Start: 2023-05-04 | End: 2023-05-08

## 2023-05-04 RX ORDER — SODIUM CHLORIDE 9 MG/ML
INJECTION, SOLUTION INTRAVENOUS CONTINUOUS
Status: DISCONTINUED | OUTPATIENT
Start: 2023-05-04 | End: 2023-05-05

## 2023-05-04 NOTE — CARDIAC REHAB
Cardiac Rehab Phase I    Activity:  Distance   Assistance needed   Patient tolerated activity . Education:  Handouts provided and reviewed: [address] Diet: Healthy Cardiac diet reviewed. Disease Process: Disease process reviewed.     Reviewed the following:       RISK FACTORS: Reviewed      SMOKING CESSATION: Reviewed      HOME EXERCISE ACTIVITY: Reviewed      OUTPATIENT CARDIAC REHAB: Referred to Cardiac Rehabilitation

## 2023-05-04 NOTE — PROGRESS NOTES
Double RN skin check done prior to transfer off unit. Skin performed by shakila RN and Jasmeet RN Wounds are as follows none Will remain available for any questions or concerns.

## 2023-05-04 NOTE — PLAN OF CARE
Problem: Patient Centered Care  Goal: Patient preferences are identified and integrated in the patient's plan of care  Description: Interventions:  - What would you like us to know as we care for you?  I live with my family  - Provide timely, complete, and accurate information to patient/family  - Incorporate patient and family knowledge, values, beliefs, and cultural backgrounds into the planning and delivery of care  - Encourage patient/family to participate in care and decision-making at the level they choose  - Honor patient and family perspectives and choices  Outcome: Progressing     Problem: Diabetes/Glucose Control  Goal: Glucose maintained within prescribed range  Description: INTERVENTIONS:  - Monitor Blood Glucose as ordered  - Assess for signs and symptoms of hyperglycemia and hypoglycemia  - Administer ordered medications to maintain glucose within target range  - Assess barriers to adequate nutritional intake and initiate nutrition consult as needed  - Instruct patient on self management of diabetes  Outcome: Progressing     Problem: Patient/Family Goals  Goal: Patient/Family Long Term Goal  Description: Patient's Long Term Goal: to be relieved from pain    Interventions:  - Monitor vital signs  -Monitor pain  -PRN medications  - See additional Care Plan goals for specific interventions  Outcome: Progressing  Goal: Patient/Family Short Term Goal  Description: Patient's Short Term Goal: to go home    Interventions:   - Monitor vital signs  -Monitor pain  - See additional Care Plan goals for specific interventions  Outcome: Progressing     Problem: CARDIOVASCULAR - ADULT  Goal: Maintains optimal cardiac output and hemodynamic stability  Description: INTERVENTIONS:  - Monitor vital signs, rhythm, and trends  - Monitor for bleeding, hypotension and signs of decreased cardiac output  - Evaluate effectiveness of vasoactive medications to optimize hemodynamic stability  - Monitor arterial and/or venous puncture sites for bleeding and/or hematoma  - Assess quality of pulses, skin color and temperature  - Assess for signs of decreased coronary artery perfusion - ex. Angina  - Evaluate fluid balance, assess for edema, trend weights  Outcome: Progressing  Goal: Absence of cardiac arrhythmias or at baseline  Description: INTERVENTIONS:  - Continuous cardiac monitoring, monitor vital signs, obtain 12 lead EKG if indicated  - Evaluate effectiveness of antiarrhythmic and heart rate control medications as ordered  - Initiate emergency measures for life threatening arrhythmias  - Monitor electrolytes and administer replacement therapy as ordered  Outcome: Progressing     Pt alert and oriented X 4. Pt on room air. Pt had complaints of abdominal pain, MD notified- PRN medication given, CT of abdomen completed. Pt had multiple episodes of emesis, MD notified and PRN medication given. Pt on IV fluids. Safety precautions in place, call light within reach. Plan is to monitor pain and blood pressure.

## 2023-05-04 NOTE — DIETARY NOTE
NUTRITION EDUCATION NOTE     Received consult for cardiac nutrition education. Pt not feeling well, did not provide verbal discussion. Provided with Eating Heart-Healthy and NCM handout to reinforce. Receptive to instruction. Would benefit from outpt f/u. Expect fair compliance.         Nguyen Davis RD, LDN  Clinical Dietitian  P: [phone]

## 2023-05-05 ENCOUNTER — APPOINTMENT (OUTPATIENT)
Dept: GENERAL RADIOLOGY | Facility: HOSPITAL | Age: 68
End: 2023-05-05
Attending: HOSPITALIST
Payer: MEDICARE

## 2023-05-05 LAB
ALBUMIN SERPL-MCNC: 3 G/DL (ref 3.4–5)
ALBUMIN/GLOB SERPL: 0.8 {RATIO} (ref 1–2)
ALP LIVER SERPL-CCNC: 96 U/L
ALT SERPL-CCNC: 20 U/L
ANION GAP SERPL CALC-SCNC: 7 MMOL/L (ref 0–18)
ANION GAP SERPL CALC-SCNC: 9 MMOL/L (ref 0–18)
ANTIBODY SCREEN: NEGATIVE
AST SERPL-CCNC: 34 U/L (ref 15–37)
ATRIAL RATE: 80 BPM
BASOPHILS # BLD AUTO: 0.02 X10(3) UL (ref 0–0.2)
BASOPHILS NFR BLD AUTO: 0.2 %
BILIRUB SERPL-MCNC: 0.4 MG/DL (ref 0.1–2)
BILIRUB UR QL: NEGATIVE
BUN BLD-MCNC: 29 MG/DL (ref 7–18)
BUN BLD-MCNC: 39 MG/DL (ref 7–18)
BUN/CREAT SERPL: 16.5 (ref 10–20)
BUN/CREAT SERPL: 20.1 (ref 10–20)
CALCIUM BLD-MCNC: 8.9 MG/DL (ref 8.5–10.1)
CALCIUM BLD-MCNC: 9.2 MG/DL (ref 8.5–10.1)
CHLORIDE SERPL-SCNC: 104 MMOL/L (ref 98–112)
CHLORIDE SERPL-SCNC: 111 MMOL/L (ref 98–112)
CO2 SERPL-SCNC: 23 MMOL/L (ref 21–32)
CO2 SERPL-SCNC: 24 MMOL/L (ref 21–32)
COLOR UR: YELLOW
CREAT BLD-MCNC: 1.76 MG/DL
CREAT BLD-MCNC: 1.94 MG/DL
DEPRECATED RDW RBC AUTO: 44 FL (ref 35.1–46.3)
EOSINOPHIL # BLD AUTO: 0.02 X10(3) UL (ref 0–0.7)
EOSINOPHIL NFR BLD AUTO: 0.2 %
ERYTHROCYTE [DISTWIDTH] IN BLOOD BY AUTOMATED COUNT: 13.4 % (ref 11–15)
GFR SERPLBLD BASED ON 1.73 SQ M-ARVRAT: 28 ML/MIN/1.73M2 (ref 60–?)
GFR SERPLBLD BASED ON 1.73 SQ M-ARVRAT: 32 ML/MIN/1.73M2 (ref 60–?)
GLOBULIN PLAS-MCNC: 3.8 G/DL (ref 2.8–4.4)
GLUCOSE BLD-MCNC: 141 MG/DL (ref 70–99)
GLUCOSE BLD-MCNC: 254 MG/DL (ref 70–99)
GLUCOSE BLDC GLUCOMTR-MCNC: 145 MG/DL (ref 70–99)
GLUCOSE BLDC GLUCOMTR-MCNC: 205 MG/DL (ref 70–99)
GLUCOSE BLDC GLUCOMTR-MCNC: 237 MG/DL (ref 70–99)
GLUCOSE BLDC GLUCOMTR-MCNC: 253 MG/DL (ref 70–99)
GLUCOSE BLDC GLUCOMTR-MCNC: 313 MG/DL (ref 70–99)
GLUCOSE UR-MCNC: >1000 MG/DL
HCT VFR BLD AUTO: 40.5 %
HGB BLD-MCNC: 12.8 G/DL
IMM GRANULOCYTES # BLD AUTO: 0.05 X10(3) UL (ref 0–1)
IMM GRANULOCYTES NFR BLD: 0.4 %
KETONES UR-MCNC: 20 MG/DL
LACTATE SERPL-SCNC: 1.4 MMOL/L (ref 0.4–2)
LEUKOCYTE ESTERASE UR QL STRIP.AUTO: NEGATIVE
LYMPHOCYTES # BLD AUTO: 2.01 X10(3) UL (ref 1–4)
LYMPHOCYTES NFR BLD AUTO: 17.6 %
MAGNESIUM SERPL-MCNC: 2 MG/DL (ref 1.6–2.6)
MCH RBC QN AUTO: 28.2 PG (ref 26–34)
MCHC RBC AUTO-ENTMCNC: 31.6 G/DL (ref 31–37)
MCV RBC AUTO: 89.2 FL
MONOCYTES # BLD AUTO: 1.29 X10(3) UL (ref 0.1–1)
MONOCYTES NFR BLD AUTO: 11.3 %
NEUTROPHILS # BLD AUTO: 8 X10 (3) UL (ref 1.5–7.7)
NEUTROPHILS # BLD AUTO: 8 X10(3) UL (ref 1.5–7.7)
NEUTROPHILS NFR BLD AUTO: 70.3 %
NITRITE UR QL STRIP.AUTO: NEGATIVE
OSMOLALITY SERPL CALC.SUM OF ELEC: 300 MOSM/KG (ref 275–295)
OSMOLALITY SERPL CALC.SUM OF ELEC: 302 MOSM/KG (ref 275–295)
P AXIS: 48 DEGREES
P-R INTERVAL: 168 MS
PH UR: 5.5 [PH] (ref 5–8)
PLATELET # BLD AUTO: 210 10(3)UL (ref 150–450)
POTASSIUM SERPL-SCNC: 3.9 MMOL/L (ref 3.5–5.1)
POTASSIUM SERPL-SCNC: 4.1 MMOL/L (ref 3.5–5.1)
PROT SERPL-MCNC: 6.8 G/DL (ref 6.4–8.2)
PROT UR-MCNC: 100 MG/DL
Q-T INTERVAL: 404 MS
QRS DURATION: 88 MS
QTC CALCULATION (BEZET): 465 MS
R AXIS: -16 DEGREES
RBC # BLD AUTO: 4.54 X10(6)UL
RH BLOOD TYPE: POSITIVE
RH BLOOD TYPE: POSITIVE
SODIUM SERPL-SCNC: 136 MMOL/L (ref 136–145)
SODIUM SERPL-SCNC: 142 MMOL/L (ref 136–145)
SP GR UR STRIP: 1.02 (ref 1–1.03)
T AXIS: -36 DEGREES
UROBILINOGEN UR STRIP-ACNC: NORMAL
VENTRICULAR RATE: 80 BPM
WBC # BLD AUTO: 11.4 X10(3) UL (ref 4–11)

## 2023-05-05 PROCEDURE — 74018 RADEX ABDOMEN 1 VIEW: CPT | Performed by: HOSPITALIST

## 2023-05-05 PROCEDURE — 99233 SBSQ HOSP IP/OBS HIGH 50: CPT | Performed by: HOSPITALIST

## 2023-05-05 NOTE — PLAN OF CARE
4084: Pt hypotensive, lethargic but responsive. Provider notified, 500ml bolus given, stat labs and EKG performed. On coming nurse notified about current situation. Problem: Patient Centered Care  Goal: Patient preferences are identified and integrated in the patient's plan of care  Description: Interventions:  - What would you like us to know as we care for you?  I live with my family  - Provide timely, complete, and accurate information to patient/family  - Incorporate patient and family knowledge, values, beliefs, and cultural backgrounds into the planning and delivery of care  - Encourage patient/family to participate in care and decision-making at the level they choose  - Honor patient and family perspectives and choices  Outcome: Progressing     Problem: Diabetes/Glucose Control  Goal: Glucose maintained within prescribed range  Description: INTERVENTIONS:  - Monitor Blood Glucose as ordered  - Assess for signs and symptoms of hyperglycemia and hypoglycemia  - Administer ordered medications to maintain glucose within target range  - Assess barriers to adequate nutritional intake and initiate nutrition consult as needed  - Instruct patient on self management of diabetes  Outcome: Progressing     Problem: Patient/Family Goals  Goal: Patient/Family Long Term Goal  Description: Patient's Long Term Goal: to be relieved from pain    Interventions:  - Monitor vital signs  -Monitor pain  -PRN medications  - See additional Care Plan goals for specific interventions  Outcome: Progressing  Goal: Patient/Family Short Term Goal  Description: Patient's Short Term Goal: to go home    Interventions:   - Monitor vital signs  -Monitor pain  - See additional Care Plan goals for specific interventions  Outcome: Progressing     Problem: CARDIOVASCULAR - ADULT  Goal: Maintains optimal cardiac output and hemodynamic stability  Description: INTERVENTIONS:  - Monitor vital signs, rhythm, and trends  - Monitor for bleeding, hypotension and signs of decreased cardiac output  - Evaluate effectiveness of vasoactive medications to optimize hemodynamic stability  - Monitor arterial and/or venous puncture sites for bleeding and/or hematoma  - Assess quality of pulses, skin color and temperature  - Assess for signs of decreased coronary artery perfusion - ex.  Angina  - Evaluate fluid balance, assess for edema, trend weights  Outcome: Progressing  Goal: Absence of cardiac arrhythmias or at baseline  Description: INTERVENTIONS:  - Continuous cardiac monitoring, monitor vital signs, obtain 12 lead EKG if indicated  - Evaluate effectiveness of antiarrhythmic and heart rate control medications as ordered  - Initiate emergency measures for life threatening arrhythmias  - Monitor electrolytes and administer replacement therapy as ordered  Outcome: Progressing

## 2023-05-06 LAB
ANION GAP SERPL CALC-SCNC: 8 MMOL/L (ref 0–18)
BASOPHILS # BLD AUTO: 0.02 X10(3) UL (ref 0–0.2)
BASOPHILS NFR BLD AUTO: 0.2 %
BUN BLD-MCNC: 32 MG/DL (ref 7–18)
BUN/CREAT SERPL: 23.5 (ref 10–20)
CALCIUM BLD-MCNC: 9.2 MG/DL (ref 8.5–10.1)
CHLORIDE SERPL-SCNC: 107 MMOL/L (ref 98–112)
CO2 SERPL-SCNC: 23 MMOL/L (ref 21–32)
CREAT BLD-MCNC: 1.36 MG/DL
DEPRECATED RDW RBC AUTO: 41.8 FL (ref 35.1–46.3)
EOSINOPHIL # BLD AUTO: 0.07 X10(3) UL (ref 0–0.7)
EOSINOPHIL NFR BLD AUTO: 0.7 %
ERYTHROCYTE [DISTWIDTH] IN BLOOD BY AUTOMATED COUNT: 13.2 % (ref 11–15)
GFR SERPLBLD BASED ON 1.73 SQ M-ARVRAT: 43 ML/MIN/1.73M2 (ref 60–?)
GLUCOSE BLD-MCNC: 179 MG/DL (ref 70–99)
GLUCOSE BLDC GLUCOMTR-MCNC: 150 MG/DL (ref 70–99)
GLUCOSE BLDC GLUCOMTR-MCNC: 166 MG/DL (ref 70–99)
GLUCOSE BLDC GLUCOMTR-MCNC: 176 MG/DL (ref 70–99)
GLUCOSE BLDC GLUCOMTR-MCNC: 200 MG/DL (ref 70–99)
GLUCOSE BLDC GLUCOMTR-MCNC: 262 MG/DL (ref 70–99)
HCT VFR BLD AUTO: 42.4 %
HGB BLD-MCNC: 13.7 G/DL
IMM GRANULOCYTES # BLD AUTO: 0.04 X10(3) UL (ref 0–1)
IMM GRANULOCYTES NFR BLD: 0.4 %
LYMPHOCYTES # BLD AUTO: 2.17 X10(3) UL (ref 1–4)
LYMPHOCYTES NFR BLD AUTO: 21.2 %
MAGNESIUM SERPL-MCNC: 2.2 MG/DL (ref 1.6–2.6)
MCH RBC QN AUTO: 28.4 PG (ref 26–34)
MCHC RBC AUTO-ENTMCNC: 32.3 G/DL (ref 31–37)
MCV RBC AUTO: 87.8 FL
MONOCYTES # BLD AUTO: 1.23 X10(3) UL (ref 0.1–1)
MONOCYTES NFR BLD AUTO: 12 %
NEUTROPHILS # BLD AUTO: 6.73 X10 (3) UL (ref 1.5–7.7)
NEUTROPHILS # BLD AUTO: 6.73 X10(3) UL (ref 1.5–7.7)
NEUTROPHILS NFR BLD AUTO: 65.5 %
OSMOLALITY SERPL CALC.SUM OF ELEC: 297 MOSM/KG (ref 275–295)
PLATELET # BLD AUTO: 217 10(3)UL (ref 150–450)
POTASSIUM SERPL-SCNC: 4.2 MMOL/L (ref 3.5–5.1)
RBC # BLD AUTO: 4.83 X10(6)UL
SODIUM SERPL-SCNC: 138 MMOL/L (ref 136–145)
WBC # BLD AUTO: 10.3 X10(3) UL (ref 4–11)

## 2023-05-06 PROCEDURE — 99233 SBSQ HOSP IP/OBS HIGH 50: CPT | Performed by: HOSPITALIST

## 2023-05-06 PROCEDURE — 99222 1ST HOSP IP/OBS MODERATE 55: CPT | Performed by: INTERNAL MEDICINE

## 2023-05-06 RX ORDER — PANTOPRAZOLE SODIUM 40 MG/1
40 TABLET, DELAYED RELEASE ORAL
Status: DISCONTINUED | OUTPATIENT
Start: 2023-05-07 | End: 2023-05-08

## 2023-05-06 RX ORDER — LISINOPRIL 20 MG/1
20 TABLET ORAL DAILY
Status: DISCONTINUED | OUTPATIENT
Start: 2023-05-06 | End: 2023-05-07

## 2023-05-06 NOTE — PLAN OF CARE
Pt received hypotensive this am, asymptomatic. Bolus given per order. BP improved. Denies abdominal pain. Nausea, emesis x 2. MD notified, plan for KUB. Problem: Patient Centered Care  Goal: Patient preferences are identified and integrated in the patient's plan of care  Description: Interventions:  - What would you like us to know as we care for you?  I live with my family  - Provide timely, complete, and accurate information to patient/family  - Incorporate patient and family knowledge, values, beliefs, and cultural backgrounds into the planning and delivery of care  - Encourage patient/family to participate in care and decision-making at the level they choose  - Honor patient and family perspectives and choices  5/5/2023 1945 by Tony Ballesteros RN  Outcome: Progressing    Problem: Diabetes/Glucose Control  Goal: Glucose maintained within prescribed range  Description: INTERVENTIONS:  - Monitor Blood Glucose as ordered  - Assess for signs and symptoms of hyperglycemia and hypoglycemia  - Administer ordered medications to maintain glucose within target range  - Assess barriers to adequate nutritional intake and initiate nutrition consult as needed  - Instruct patient on self management of diabetes  5/5/2023 1945 by Tony Ballesteros RN  Outcome: Progressing  5/5/2023 1945 by Tony Ballesteros RN  Outcome: Progressing     Problem: Patient/Family Goals  Goal: Patient/Family Long Term Goal  Description: Patient's Long Term Goal: to be relieved from pain    Interventions:  - Monitor vital signs  -Monitor pain  -PRN medications  - See additional Care Plan goals for specific interventions  5/5/2023 1945 by Tony Ballesteros RN  Outcome: Progressing  5/5/2023 1945 by Tony Ballesteros RN  Outcome: Progressing  Goal: Patient/Family Short Term Goal  Description: Patient's Short Term Goal: to go home    Interventions:   - Monitor vital signs  -Monitor pain  - See additional Care Plan goals for specific interventions  5/5/2023 1945 by Mervin Lee RN  Outcome: Progressing  5/5/2023 1945 by Mervin Lee RN  Outcome: Progressing     Problem: CARDIOVASCULAR - ADULT  Goal: Maintains optimal cardiac output and hemodynamic stability  Description: INTERVENTIONS:  - Monitor vital signs, rhythm, and trends  - Monitor for bleeding, hypotension and signs of decreased cardiac output  - Evaluate effectiveness of vasoactive medications to optimize hemodynamic stability  - Monitor arterial and/or venous puncture sites for bleeding and/or hematoma  - Assess quality of pulses, skin color and temperature  - Assess for signs of decreased coronary artery perfusion - ex.  Angina  - Evaluate fluid balance, assess for edema, trend weights  5/5/2023 1945 by Mervin Lee RN  Outcome: Progressing  5/5/2023 1945 by Mervin Lee RN  Outcome: Progressing  Goal: Absence of cardiac arrhythmias or at baseline  Description: INTERVENTIONS:  - Continuous cardiac monitoring, monitor vital signs, obtain 12 lead EKG if indicated  - Evaluate effectiveness of antiarrhythmic and heart rate control medications as ordered  - Initiate emergency measures for life threatening arrhythmias  - Monitor electrolytes and administer replacement therapy as ordered  5/5/2023 1945 by Mervin Lee RN  Outcome: Progressing  5/5/2023 1945 by Mervin Lee RN  Outcome: Progressing

## 2023-05-06 NOTE — PROGRESS NOTES
Pt had 10 beats of VT. Pt nauseous and vomiting. Denies chest pain or shortness of breath. HAYDEN WADE notified, orders for STAT labs placed. Pt vomiting earlier lunch, denies abdominal pain. Reports small stool yesterday. PRN's given for nausea. MD notified.  Lorna Tomas RN

## 2023-05-06 NOTE — PLAN OF CARE
Problem: Patient Centered Care  Goal: Patient preferences are identified and integrated in the patient's plan of care  Description: Interventions:  - What would you like us to know as we care for you?  I live with my family  - Provide timely, complete, and accurate information to patient/family  - Incorporate patient and family knowledge, values, beliefs, and cultural backgrounds into the planning and delivery of care  - Encourage patient/family to participate in care and decision-making at the level they choose  - Honor patient and family perspectives and choices  5/6/2023 0512 by Jim Mitchell  Outcome: Progressing  5/6/2023 0512 by Jim Mitchell  Outcome: Progressing     Problem: Diabetes/Glucose Control  Goal: Glucose maintained within prescribed range  Description: INTERVENTIONS:  - Monitor Blood Glucose as ordered  - Assess for signs and symptoms of hyperglycemia and hypoglycemia  - Administer ordered medications to maintain glucose within target range  - Assess barriers to adequate nutritional intake and initiate nutrition consult as needed  - Instruct patient on self management of diabetes  5/6/2023 0512 by Jim Mitchell  Outcome: Progressing  5/6/2023 0512 by Jim Mitchell  Outcome: Progressing     Problem: Patient/Family Goals  Goal: Patient/Family Long Term Goal  Description: Patient's Long Term Goal: to be relieved from pain    Interventions:  - Monitor vital signs  -Monitor pain  -PRN medications  - See additional Care Plan goals for specific interventions  5/6/2023 0512 by Jim Mitchell  Outcome: Progressing  5/6/2023 0512 by Jim Mitchell  Outcome: Progressing  Goal: Patient/Family Short Term Goal  Description: Patient's Short Term Goal: to go home    Interventions:   - Monitor vital signs  -Monitor pain  - See additional Care Plan goals for specific interventions  5/6/2023 0512 by Jim Mitchell  Outcome: Progressing  5/6/2023 0512 by Jim Mitchell  Outcome: Progressing     Problem: CARDIOVASCULAR - ADULT  Goal: Maintains optimal cardiac output and hemodynamic stability  Description: INTERVENTIONS:  - Monitor vital signs, rhythm, and trends  - Monitor for bleeding, hypotension and signs of decreased cardiac output  - Evaluate effectiveness of vasoactive medications to optimize hemodynamic stability  - Monitor arterial and/or venous puncture sites for bleeding and/or hematoma  - Assess quality of pulses, skin color and temperature  - Assess for signs of decreased coronary artery perfusion - ex.  Angina  - Evaluate fluid balance, assess for edema, trend weights  5/6/2023 0512 by Mary Anne Santa  Outcome: Progressing  5/6/2023 0512 by Mary Anne Santa  Outcome: Progressing  Goal: Absence of cardiac arrhythmias or at baseline  Description: INTERVENTIONS:  - Continuous cardiac monitoring, monitor vital signs, obtain 12 lead EKG if indicated  - Evaluate effectiveness of antiarrhythmic and heart rate control medications as ordered  - Initiate emergency measures for life threatening arrhythmias  - Monitor electrolytes and administer replacement therapy as ordered  5/6/2023 0512 by Mary Anne Santa  Outcome: Progressing  5/6/2023 0512 by Mary Anne Santa  Outcome: Progressing

## 2023-05-06 NOTE — PLAN OF CARE
Patient alert and oriented.  at the bedside in the morning. Sennokot, Miralax, and Ducolax given for constipation. Zofran given PRN for nausea and morphine given PRN for pain. GI consulted by MD. Patient up to bathroom with standby assist. Resting in bed with fall precautions in place and call light in reach. Plan to discharge home when medically clear. Problem: Patient Centered Care  Goal: Patient preferences are identified and integrated in the patient's plan of care  Description: Interventions:  - What would you like us to know as we care for you?  I live with my family  - Provide timely, complete, and accurate information to patient/family  - Incorporate patient and family knowledge, values, beliefs, and cultural backgrounds into the planning and delivery of care  - Encourage patient/family to participate in care and decision-making at the level they choose  - Honor patient and family perspectives and choices  Outcome: Progressing     Problem: Diabetes/Glucose Control  Goal: Glucose maintained within prescribed range  Description: INTERVENTIONS:  - Monitor Blood Glucose as ordered  - Assess for signs and symptoms of hyperglycemia and hypoglycemia  - Administer ordered medications to maintain glucose within target range  - Assess barriers to adequate nutritional intake and initiate nutrition consult as needed  - Instruct patient on self management of diabetes  Outcome: Progressing     Problem: Patient/Family Goals  Goal: Patient/Family Long Term Goal  Description: Patient's Long Term Goal: to be relieved from pain    Interventions:  - Monitor vital signs  -Monitor pain  -PRN medications  - See additional Care Plan goals for specific interventions  Outcome: Progressing  Goal: Patient/Family Short Term Goal  Description: Patient's Short Term Goal: to go home    Interventions:   - Monitor vital signs  -Monitor pain  - See additional Care Plan goals for specific interventions  Outcome: Progressing Problem: CARDIOVASCULAR - ADULT  Goal: Maintains optimal cardiac output and hemodynamic stability  Description: INTERVENTIONS:  - Monitor vital signs, rhythm, and trends  - Monitor for bleeding, hypotension and signs of decreased cardiac output  - Evaluate effectiveness of vasoactive medications to optimize hemodynamic stability  - Monitor arterial and/or venous puncture sites for bleeding and/or hematoma  - Assess quality of pulses, skin color and temperature  - Assess for signs of decreased coronary artery perfusion - ex.  Angina  - Evaluate fluid balance, assess for edema, trend weights  Outcome: Progressing  Goal: Absence of cardiac arrhythmias or at baseline  Description: INTERVENTIONS:  - Continuous cardiac monitoring, monitor vital signs, obtain 12 lead EKG if indicated  - Evaluate effectiveness of antiarrhythmic and heart rate control medications as ordered  - Initiate emergency measures for life threatening arrhythmias  - Monitor electrolytes and administer replacement therapy as ordered  Outcome: Progressing

## 2023-05-07 LAB
ANION GAP SERPL CALC-SCNC: 8 MMOL/L (ref 0–18)
BASOPHILS # BLD AUTO: 0.02 X10(3) UL (ref 0–0.2)
BASOPHILS NFR BLD AUTO: 0.2 %
BUN BLD-MCNC: 22 MG/DL (ref 7–18)
BUN/CREAT SERPL: 20.2 (ref 10–20)
CALCIUM BLD-MCNC: 9 MG/DL (ref 8.5–10.1)
CHLORIDE SERPL-SCNC: 107 MMOL/L (ref 98–112)
CO2 SERPL-SCNC: 23 MMOL/L (ref 21–32)
CREAT BLD-MCNC: 1.09 MG/DL
DEPRECATED RDW RBC AUTO: 41.8 FL (ref 35.1–46.3)
EOSINOPHIL # BLD AUTO: 0.03 X10(3) UL (ref 0–0.7)
EOSINOPHIL NFR BLD AUTO: 0.3 %
ERYTHROCYTE [DISTWIDTH] IN BLOOD BY AUTOMATED COUNT: 13 % (ref 11–15)
GFR SERPLBLD BASED ON 1.73 SQ M-ARVRAT: 56 ML/MIN/1.73M2 (ref 60–?)
GLUCOSE BLD-MCNC: 195 MG/DL (ref 70–99)
GLUCOSE BLDC GLUCOMTR-MCNC: 151 MG/DL (ref 70–99)
GLUCOSE BLDC GLUCOMTR-MCNC: 191 MG/DL (ref 70–99)
GLUCOSE BLDC GLUCOMTR-MCNC: 249 MG/DL (ref 70–99)
GLUCOSE BLDC GLUCOMTR-MCNC: 259 MG/DL (ref 70–99)
HCT VFR BLD AUTO: 44.5 %
HGB BLD-MCNC: 14.3 G/DL
IMM GRANULOCYTES # BLD AUTO: 0.04 X10(3) UL (ref 0–1)
IMM GRANULOCYTES NFR BLD: 0.4 %
LYMPHOCYTES # BLD AUTO: 1.04 X10(3) UL (ref 1–4)
LYMPHOCYTES NFR BLD AUTO: 11.2 %
MAGNESIUM SERPL-MCNC: 2.1 MG/DL (ref 1.6–2.6)
MCH RBC QN AUTO: 28.2 PG (ref 26–34)
MCHC RBC AUTO-ENTMCNC: 32.1 G/DL (ref 31–37)
MCV RBC AUTO: 87.8 FL
MONOCYTES # BLD AUTO: 1.04 X10(3) UL (ref 0.1–1)
MONOCYTES NFR BLD AUTO: 11.2 %
NEUTROPHILS # BLD AUTO: 7.1 X10 (3) UL (ref 1.5–7.7)
NEUTROPHILS # BLD AUTO: 7.1 X10(3) UL (ref 1.5–7.7)
NEUTROPHILS NFR BLD AUTO: 76.7 %
OSMOLALITY SERPL CALC.SUM OF ELEC: 295 MOSM/KG (ref 275–295)
PLATELET # BLD AUTO: 218 10(3)UL (ref 150–450)
POTASSIUM SERPL-SCNC: 3.8 MMOL/L (ref 3.5–5.1)
RBC # BLD AUTO: 5.07 X10(6)UL
SODIUM SERPL-SCNC: 138 MMOL/L (ref 136–145)
WBC # BLD AUTO: 9.3 X10(3) UL (ref 4–11)

## 2023-05-07 PROCEDURE — 99232 SBSQ HOSP IP/OBS MODERATE 35: CPT | Performed by: INTERNAL MEDICINE

## 2023-05-07 PROCEDURE — 99233 SBSQ HOSP IP/OBS HIGH 50: CPT | Performed by: HOSPITALIST

## 2023-05-07 RX ORDER — LISINOPRIL 40 MG/1
40 TABLET ORAL DAILY
Status: DISCONTINUED | OUTPATIENT
Start: 2023-05-07 | End: 2023-05-10

## 2023-05-07 RX ORDER — CARVEDILOL 12.5 MG/1
12.5 TABLET ORAL 2 TIMES DAILY WITH MEALS
Status: DISCONTINUED | OUTPATIENT
Start: 2023-05-07 | End: 2023-05-10

## 2023-05-07 NOTE — PLAN OF CARE
Patient alert and oriented. Family at the bedside during the day. Morphine given PRN for pain and Reglan given PRN for nausea. Patient ambulating independently in room. GI signed off. Resting in bed with fall precautions in place and call light in reach. Plan to discharge home when medically clear. Problem: Patient Centered Care  Goal: Patient preferences are identified and integrated in the patient's plan of care  Description: Interventions:  - What would you like us to know as we care for you?  I live with my family  - Provide timely, complete, and accurate information to patient/family  - Incorporate patient and family knowledge, values, beliefs, and cultural backgrounds into the planning and delivery of care  - Encourage patient/family to participate in care and decision-making at the level they choose  - Honor patient and family perspectives and choices  Outcome: Progressing     Problem: Diabetes/Glucose Control  Goal: Glucose maintained within prescribed range  Description: INTERVENTIONS:  - Monitor Blood Glucose as ordered  - Assess for signs and symptoms of hyperglycemia and hypoglycemia  - Administer ordered medications to maintain glucose within target range  - Assess barriers to adequate nutritional intake and initiate nutrition consult as needed  - Instruct patient on self management of diabetes  Outcome: Progressing     Problem: Patient/Family Goals  Goal: Patient/Family Long Term Goal  Description: Patient's Long Term Goal: to be relieved from pain    Interventions:  - Monitor vital signs  -Monitor pain  -PRN medications  - See additional Care Plan goals for specific interventions  Outcome: Progressing  Goal: Patient/Family Short Term Goal  Description: Patient's Short Term Goal: to go home    Interventions:   - Monitor vital signs  -Monitor pain  - See additional Care Plan goals for specific interventions  Outcome: Progressing     Problem: CARDIOVASCULAR - ADULT  Goal: Maintains optimal cardiac output and hemodynamic stability  Description: INTERVENTIONS:  - Monitor vital signs, rhythm, and trends  - Monitor for bleeding, hypotension and signs of decreased cardiac output  - Evaluate effectiveness of vasoactive medications to optimize hemodynamic stability  - Monitor arterial and/or venous puncture sites for bleeding and/or hematoma  - Assess quality of pulses, skin color and temperature  - Assess for signs of decreased coronary artery perfusion - ex.  Angina  - Evaluate fluid balance, assess for edema, trend weights  Outcome: Progressing  Goal: Absence of cardiac arrhythmias or at baseline  Description: INTERVENTIONS:  - Continuous cardiac monitoring, monitor vital signs, obtain 12 lead EKG if indicated  - Evaluate effectiveness of antiarrhythmic and heart rate control medications as ordered  - Initiate emergency measures for life threatening arrhythmias  - Monitor electrolytes and administer replacement therapy as ordered  Outcome: Progressing

## 2023-05-07 NOTE — PLAN OF CARE
Problem: Patient Centered Care  Goal: Patient preferences are identified and integrated in the patient's plan of care  Description: Interventions:  - What would you like us to know as we care for you?  I live with my family  - Provide timely, complete, and accurate information to patient/family  - Incorporate patient and family knowledge, values, beliefs, and cultural backgrounds into the planning and delivery of care  - Encourage patient/family to participate in care and decision-making at the level they choose  - Honor patient and family perspectives and choices  Outcome: Progressing     Problem: Diabetes/Glucose Control  Goal: Glucose maintained within prescribed range  Description: INTERVENTIONS:  - Monitor Blood Glucose as ordered  - Assess for signs and symptoms of hyperglycemia and hypoglycemia  - Administer ordered medications to maintain glucose within target range  - Assess barriers to adequate nutritional intake and initiate nutrition consult as needed  - Instruct patient on self management of diabetes  Outcome: Progressing     Problem: Patient/Family Goals  Goal: Patient/Family Long Term Goal  Description: Patient's Long Term Goal: to be relieved from pain    Interventions:  - Monitor vital signs  -Monitor pain  -PRN medications  - See additional Care Plan goals for specific interventions  Outcome: Progressing  Goal: Patient/Family Short Term Goal  Description: Patient's Short Term Goal: to go home    Interventions:   - Monitor vital signs  -Monitor pain  - See additional Care Plan goals for specific interventions  Outcome: Progressing     Problem: CARDIOVASCULAR - ADULT  Goal: Maintains optimal cardiac output and hemodynamic stability  Description: INTERVENTIONS:  - Monitor vital signs, rhythm, and trends  - Monitor for bleeding, hypotension and signs of decreased cardiac output  - Evaluate effectiveness of vasoactive medications to optimize hemodynamic stability  - Monitor arterial and/or venous puncture sites for bleeding and/or hematoma  - Assess quality of pulses, skin color and temperature  - Assess for signs of decreased coronary artery perfusion - ex.  Angina  - Evaluate fluid balance, assess for edema, trend weights  Outcome: Progressing  Goal: Absence of cardiac arrhythmias or at baseline  Description: INTERVENTIONS:  - Continuous cardiac monitoring, monitor vital signs, obtain 12 lead EKG if indicated  - Evaluate effectiveness of antiarrhythmic and heart rate control medications as ordered  - Initiate emergency measures for life threatening arrhythmias  - Monitor electrolytes and administer replacement therapy as ordered  Outcome: Progressing   Patient alert and oriented x 4,seen by GI last night , recommend golytely bowel prep as much as she can tolerate, possible EGD on Monday , PRN pain medication, son at the bedside

## 2023-05-08 ENCOUNTER — APPOINTMENT (OUTPATIENT)
Dept: GENERAL RADIOLOGY | Facility: HOSPITAL | Age: 68
End: 2023-05-08
Attending: REGISTERED NURSE
Payer: MEDICARE

## 2023-05-08 LAB
ANION GAP SERPL CALC-SCNC: 8 MMOL/L (ref 0–18)
BASOPHILS # BLD AUTO: 0.02 X10(3) UL (ref 0–0.2)
BASOPHILS NFR BLD AUTO: 0.2 %
BUN BLD-MCNC: 25 MG/DL (ref 7–18)
BUN/CREAT SERPL: 17.1 (ref 10–20)
CALCIUM BLD-MCNC: 8.5 MG/DL (ref 8.5–10.1)
CHLORIDE SERPL-SCNC: 103 MMOL/L (ref 98–112)
CO2 SERPL-SCNC: 22 MMOL/L (ref 21–32)
CREAT BLD-MCNC: 1.46 MG/DL
DEPRECATED RDW RBC AUTO: 42.8 FL (ref 35.1–46.3)
EOSINOPHIL # BLD AUTO: 0.08 X10(3) UL (ref 0–0.7)
EOSINOPHIL NFR BLD AUTO: 0.8 %
ERYTHROCYTE [DISTWIDTH] IN BLOOD BY AUTOMATED COUNT: 13.2 % (ref 11–15)
GFR SERPLBLD BASED ON 1.73 SQ M-ARVRAT: 40 ML/MIN/1.73M2 (ref 60–?)
GLUCOSE BLD-MCNC: 238 MG/DL (ref 70–99)
GLUCOSE BLDC GLUCOMTR-MCNC: 174 MG/DL (ref 70–99)
GLUCOSE BLDC GLUCOMTR-MCNC: 176 MG/DL (ref 70–99)
GLUCOSE BLDC GLUCOMTR-MCNC: 199 MG/DL (ref 70–99)
GLUCOSE BLDC GLUCOMTR-MCNC: 225 MG/DL (ref 70–99)
H PYLORI AG STL QL IA: NEGATIVE
HCT VFR BLD AUTO: 41.9 %
HGB BLD-MCNC: 13.5 G/DL
IMM GRANULOCYTES # BLD AUTO: 0.03 X10(3) UL (ref 0–1)
IMM GRANULOCYTES NFR BLD: 0.3 %
LYMPHOCYTES # BLD AUTO: 1.22 X10(3) UL (ref 1–4)
LYMPHOCYTES NFR BLD AUTO: 12.1 %
MAGNESIUM SERPL-MCNC: 1.9 MG/DL (ref 1.6–2.6)
MCH RBC QN AUTO: 28.4 PG (ref 26–34)
MCHC RBC AUTO-ENTMCNC: 32.2 G/DL (ref 31–37)
MCV RBC AUTO: 88.2 FL
MONOCYTES # BLD AUTO: 0.75 X10(3) UL (ref 0.1–1)
MONOCYTES NFR BLD AUTO: 7.4 %
NEUTROPHILS # BLD AUTO: 8 X10 (3) UL (ref 1.5–7.7)
NEUTROPHILS # BLD AUTO: 8 X10(3) UL (ref 1.5–7.7)
NEUTROPHILS NFR BLD AUTO: 79.2 %
OSMOLALITY SERPL CALC.SUM OF ELEC: 288 MOSM/KG (ref 275–295)
PHOSPHATE SERPL-MCNC: 2.4 MG/DL (ref 2.5–4.9)
PLATELET # BLD AUTO: 190 10(3)UL (ref 150–450)
POTASSIUM SERPL-SCNC: 3.8 MMOL/L (ref 3.5–5.1)
RBC # BLD AUTO: 4.75 X10(6)UL
SODIUM SERPL-SCNC: 133 MMOL/L (ref 136–145)
WBC # BLD AUTO: 10.1 X10(3) UL (ref 4–11)

## 2023-05-08 PROCEDURE — 74018 RADEX ABDOMEN 1 VIEW: CPT | Performed by: REGISTERED NURSE

## 2023-05-08 PROCEDURE — 99233 SBSQ HOSP IP/OBS HIGH 50: CPT | Performed by: HOSPITALIST

## 2023-05-08 PROCEDURE — 99232 SBSQ HOSP IP/OBS MODERATE 35: CPT | Performed by: REGISTERED NURSE

## 2023-05-08 RX ORDER — ACETAMINOPHEN 500 MG
1000 TABLET ORAL EVERY 8 HOURS PRN
Status: DISCONTINUED | OUTPATIENT
Start: 2023-05-08 | End: 2023-05-10

## 2023-05-08 RX ORDER — METOCLOPRAMIDE HYDROCHLORIDE 5 MG/ML
10 INJECTION INTRAMUSCULAR; INTRAVENOUS EVERY 6 HOURS PRN
Status: DISCONTINUED | OUTPATIENT
Start: 2023-05-08 | End: 2023-05-10

## 2023-05-08 RX ORDER — PROCHLORPERAZINE EDISYLATE 5 MG/ML
10 INJECTION INTRAMUSCULAR; INTRAVENOUS EVERY 6 HOURS PRN
Status: DISCONTINUED | OUTPATIENT
Start: 2023-05-08 | End: 2023-05-08

## 2023-05-08 RX ORDER — ONDANSETRON 2 MG/ML
4 INJECTION INTRAMUSCULAR; INTRAVENOUS EVERY 4 HOURS PRN
Status: DISCONTINUED | OUTPATIENT
Start: 2023-05-08 | End: 2023-05-10

## 2023-05-08 RX ORDER — SUCRALFATE ORAL 1 G/10ML
1 SUSPENSION ORAL
Status: DISCONTINUED | OUTPATIENT
Start: 2023-05-08 | End: 2023-05-10

## 2023-05-08 NOTE — PLAN OF CARE
Patient alert and oriented on room air with complaints of nausea and pain overnight. Tramadol and zofran given for relief. Call light in reach and safety measures implemented. Problem: Patient Centered Care  Goal: Patient preferences are identified and integrated in the patient's plan of care  Description: Interventions:  - What would you like us to know as we care for you?  I live with my family  - Provide timely, complete, and accurate information to patient/family  - Incorporate patient and family knowledge, values, beliefs, and cultural backgrounds into the planning and delivery of care  - Encourage patient/family to participate in care and decision-making at the level they choose  - Honor patient and family perspectives and choices  Outcome: Progressing     Problem: Diabetes/Glucose Control  Goal: Glucose maintained within prescribed range  Description: INTERVENTIONS:  - Monitor Blood Glucose as ordered  - Assess for signs and symptoms of hyperglycemia and hypoglycemia  - Administer ordered medications to maintain glucose within target range  - Assess barriers to adequate nutritional intake and initiate nutrition consult as needed  - Instruct patient on self management of diabetes  Outcome: Progressing     Problem: Patient/Family Goals  Goal: Patient/Family Long Term Goal  Description: Patient's Long Term Goal: to be relieved from pain    Interventions:  - Monitor vital signs  -Monitor pain  -PRN medications  - See additional Care Plan goals for specific interventions  Outcome: Progressing  Goal: Patient/Family Short Term Goal  Description: Patient's Short Term Goal: to go home    Interventions:   - Monitor vital signs  -Monitor pain  - See additional Care Plan goals for specific interventions  Outcome: Progressing     Problem: CARDIOVASCULAR - ADULT  Goal: Maintains optimal cardiac output and hemodynamic stability  Description: INTERVENTIONS:  - Monitor vital signs, rhythm, and trends  - Monitor for bleeding, hypotension and signs of decreased cardiac output  - Evaluate effectiveness of vasoactive medications to optimize hemodynamic stability  - Monitor arterial and/or venous puncture sites for bleeding and/or hematoma  - Assess quality of pulses, skin color and temperature  - Assess for signs of decreased coronary artery perfusion - ex.  Angina  - Evaluate fluid balance, assess for edema, trend weights  Outcome: Progressing  Goal: Absence of cardiac arrhythmias or at baseline  Description: INTERVENTIONS:  - Continuous cardiac monitoring, monitor vital signs, obtain 12 lead EKG if indicated  - Evaluate effectiveness of antiarrhythmic and heart rate control medications as ordered  - Initiate emergency measures for life threatening arrhythmias  - Monitor electrolytes and administer replacement therapy as ordered  Outcome: Progressing

## 2023-05-08 NOTE — PROGRESS NOTES
Patient's vitals were checked and rechecked due to b/p readings jumping around. Patient currently hypotensive but asymptomatic. MD made aware and order for 250 ml bolus put in. Will recheck in 30 min to 1 hr after administration and reassess blood pressure.

## 2023-05-08 NOTE — PLAN OF CARE
Problem: Patient Centered Care  Goal: Patient preferences are identified and integrated in the patient's plan of care  Description: Interventions:  - What would you like us to know as we care for you?  I live with my family  - Provide timely, complete, and accurate information to patient/family  - Incorporate patient and family knowledge, values, beliefs, and cultural backgrounds into the planning and delivery of care  - Encourage patient/family to participate in care and decision-making at the level they choose  - Honor patient and family perspectives and choices  Outcome: Progressing     Problem: Diabetes/Glucose Control  Goal: Glucose maintained within prescribed range  Description: INTERVENTIONS:  - Monitor Blood Glucose as ordered  - Assess for signs and symptoms of hyperglycemia and hypoglycemia  - Administer ordered medications to maintain glucose within target range  - Assess barriers to adequate nutritional intake and initiate nutrition consult as needed  - Instruct patient on self management of diabetes  Outcome: Progressing     Problem: Patient/Family Goals  Goal: Patient/Family Long Term Goal  Description: Patient's Long Term Goal: to be relieved from pain    Interventions:  - Monitor vital signs  -Monitor pain  -PRN medications  - See additional Care Plan goals for specific interventions  Outcome: Progressing  Goal: Patient/Family Short Term Goal  Description: Patient's Short Term Goal: to go home    Interventions:   - Monitor vital signs  -Monitor pain  - See additional Care Plan goals for specific interventions  Outcome: Progressing     Problem: CARDIOVASCULAR - ADULT  Goal: Maintains optimal cardiac output and hemodynamic stability  Description: INTERVENTIONS:  - Monitor vital signs, rhythm, and trends  - Monitor for bleeding, hypotension and signs of decreased cardiac output  - Evaluate effectiveness of vasoactive medications to optimize hemodynamic stability  - Monitor arterial and/or venous puncture sites for bleeding and/or hematoma  - Assess quality of pulses, skin color and temperature  - Assess for signs of decreased coronary artery perfusion - ex.  Angina  - Evaluate fluid balance, assess for edema, trend weights  Outcome: Progressing  Goal: Absence of cardiac arrhythmias or at baseline  Description: INTERVENTIONS:  - Continuous cardiac monitoring, monitor vital signs, obtain 12 lead EKG if indicated  - Evaluate effectiveness of antiarrhythmic and heart rate control medications as ordered  - Initiate emergency measures for life threatening arrhythmias  - Monitor electrolytes and administer replacement therapy as ordered  Outcome: Progressing

## 2023-05-09 ENCOUNTER — TELEPHONE (OUTPATIENT)
Facility: CLINIC | Age: 68
End: 2023-05-09

## 2023-05-09 LAB
ANION GAP SERPL CALC-SCNC: 6 MMOL/L (ref 0–18)
BASOPHILS # BLD AUTO: 0.02 X10(3) UL (ref 0–0.2)
BASOPHILS NFR BLD AUTO: 0.3 %
BUN BLD-MCNC: 25 MG/DL (ref 7–18)
BUN/CREAT SERPL: 16.4 (ref 10–20)
CALCIUM BLD-MCNC: 8.5 MG/DL (ref 8.5–10.1)
CHLORIDE SERPL-SCNC: 104 MMOL/L (ref 98–112)
CO2 SERPL-SCNC: 24 MMOL/L (ref 21–32)
CREAT BLD-MCNC: 1.52 MG/DL
DEPRECATED RDW RBC AUTO: 42.6 FL (ref 35.1–46.3)
EOSINOPHIL # BLD AUTO: 0.16 X10(3) UL (ref 0–0.7)
EOSINOPHIL NFR BLD AUTO: 2.2 %
ERYTHROCYTE [DISTWIDTH] IN BLOOD BY AUTOMATED COUNT: 13.2 % (ref 11–15)
GFR SERPLBLD BASED ON 1.73 SQ M-ARVRAT: 38 ML/MIN/1.73M2 (ref 60–?)
GLUCOSE BLD-MCNC: 172 MG/DL (ref 70–99)
GLUCOSE BLDC GLUCOMTR-MCNC: 198 MG/DL (ref 70–99)
GLUCOSE BLDC GLUCOMTR-MCNC: 207 MG/DL (ref 70–99)
GLUCOSE BLDC GLUCOMTR-MCNC: 219 MG/DL (ref 70–99)
GLUCOSE BLDC GLUCOMTR-MCNC: 254 MG/DL (ref 70–99)
HCT VFR BLD AUTO: 38.8 %
HGB BLD-MCNC: 12.3 G/DL
IMM GRANULOCYTES # BLD AUTO: 0.04 X10(3) UL (ref 0–1)
IMM GRANULOCYTES NFR BLD: 0.5 %
LYMPHOCYTES # BLD AUTO: 1.04 X10(3) UL (ref 1–4)
LYMPHOCYTES NFR BLD AUTO: 14 %
MCH RBC QN AUTO: 27.8 PG (ref 26–34)
MCHC RBC AUTO-ENTMCNC: 31.7 G/DL (ref 31–37)
MCV RBC AUTO: 87.8 FL
MONOCYTES # BLD AUTO: 1.04 X10(3) UL (ref 0.1–1)
MONOCYTES NFR BLD AUTO: 14 %
NEUTROPHILS # BLD AUTO: 5.11 X10 (3) UL (ref 1.5–7.7)
NEUTROPHILS # BLD AUTO: 5.11 X10(3) UL (ref 1.5–7.7)
NEUTROPHILS NFR BLD AUTO: 69 %
OSMOLALITY SERPL CALC.SUM OF ELEC: 286 MOSM/KG (ref 275–295)
PHOSPHATE SERPL-MCNC: 2.5 MG/DL (ref 2.5–4.9)
PLATELET # BLD AUTO: 170 10(3)UL (ref 150–450)
POTASSIUM SERPL-SCNC: 4.2 MMOL/L (ref 3.5–5.1)
RBC # BLD AUTO: 4.42 X10(6)UL
SODIUM SERPL-SCNC: 134 MMOL/L (ref 136–145)
WBC # BLD AUTO: 7.4 X10(3) UL (ref 4–11)

## 2023-05-09 PROCEDURE — 99233 SBSQ HOSP IP/OBS HIGH 50: CPT | Performed by: NURSE PRACTITIONER

## 2023-05-09 PROCEDURE — 99233 SBSQ HOSP IP/OBS HIGH 50: CPT | Performed by: HOSPITALIST

## 2023-05-09 RX ORDER — PANTOPRAZOLE SODIUM 40 MG/1
40 TABLET, DELAYED RELEASE ORAL
Qty: 60 TABLET | Refills: 0 | Status: SHIPPED | OUTPATIENT
Start: 2023-05-09

## 2023-05-09 RX ORDER — POLYETHYLENE GLYCOL 3350 17 G/17G
17 POWDER, FOR SOLUTION ORAL 2 TIMES DAILY
Status: DISCONTINUED | OUTPATIENT
Start: 2023-05-09 | End: 2023-05-10

## 2023-05-09 RX ORDER — ROSUVASTATIN CALCIUM 40 MG/1
40 TABLET, COATED ORAL NIGHTLY
Qty: 30 TABLET | Refills: 3 | Status: SHIPPED | OUTPATIENT
Start: 2023-05-09

## 2023-05-09 RX ORDER — LISINOPRIL 40 MG/1
40 TABLET ORAL DAILY
Qty: 30 TABLET | Refills: 3 | Status: SHIPPED | OUTPATIENT
Start: 2023-05-10 | End: 2023-05-17

## 2023-05-09 RX ORDER — CARVEDILOL 12.5 MG/1
12.5 TABLET ORAL 2 TIMES DAILY WITH MEALS
Qty: 60 TABLET | Refills: 3 | Status: SHIPPED | OUTPATIENT
Start: 2023-05-09 | End: 2023-05-17

## 2023-05-09 RX ORDER — ASPIRIN 81 MG/1
81 TABLET ORAL DAILY
Qty: 30 TABLET | Refills: 3 | Status: SHIPPED | OUTPATIENT
Start: 2023-05-10

## 2023-05-09 RX ORDER — FENOFIBRATE 67 MG/1
67 CAPSULE ORAL
Qty: 30 CAPSULE | Refills: 0 | Status: SHIPPED | OUTPATIENT
Start: 2023-05-10 | End: 2023-06-09

## 2023-05-09 RX ORDER — SUCRALFATE ORAL 1 G/10ML
1 SUSPENSION ORAL
Qty: 560 ML | Refills: 0 | Status: SHIPPED | OUTPATIENT
Start: 2023-05-09 | End: 2023-05-23

## 2023-05-09 NOTE — DISCHARGE INSTRUCTIONS
Follow up with GI one week     Follow up with Cards one week     Fu primary 1 week    Ok to go home  Soft/bland diet     Medication List        START taking these medications      aspirin 81 MG Tbec  Take 1 tablet (81 mg total) by mouth daily. carvedilol 12.5 MG Tabs  Commonly known as: Coreg  Take 1 tablet (12.5 mg total) by mouth 2 (two) times daily with meals. fenofibrate micronized 67 MG Caps  Commonly known as: Lofibra  Take 1 capsule (67 mg total) by mouth daily with breakfast.  Replaces: fenofibrate 145 MG Tabs     pantoprazole 40 MG Tbec  Commonly known as: Protonix  Take 1 tablet (40 mg total) by mouth 2 (two) times daily before meals. rosuvastatin 40 MG Tabs  Commonly known as: Crestor  Take 1 tablet (40 mg total) by mouth nightly. sucralfate 1 GM/10ML Susp  Commonly known as: Carafate  Take 10 mL (1 g total) by mouth 4 (four) times daily before meals and nightly (2200) for 14 days. ticagrelor 90 MG Tabs  Commonly known as: Brilinta  Take 1 tablet (90 mg total) by mouth every 12 (twelve) hours. CHANGE how you take these medications      lisinopril 40 MG Tabs  Commonly known as: Prinivil; Zestril  Take 1 tablet (40 mg total) by mouth daily. What changed:   medication strength  how much to take            CONTINUE taking these medications      Accu-Chek Guide Strp  CHECK BS QD     Accu-Chek Guide w/Device Kit  1 each daily. Accu-Chek Multiclix Lancets Misc  CHECK BS QD     Farxiga 10 MG Tabs  Generic drug: dapagliflozin  Take 1 tablet by mouth once daily     gabapentin 300 MG Caps  Commonly known as: Neurontin     glipiZIDE ER 5 MG Tb24  Commonly known as: Glucotrol XL  Take 1 tablet (5 mg total) by mouth in the morning and 1 tablet (5 mg total) before bedtime. levothyroxine 200 MCG Tabs  Commonly known as: Synthroid  Take 1 tablet (200 mcg total) by mouth before breakfast.     meclizine 25 MG Tabs  Commonly known as:  Antivert  Take 1 tablet (25 mg total) by mouth 3 (three) times daily as needed. * ondansetron 4 mg tablet  Commonly known as: Zofran  TAKE 1 TABLET BY MOUTH EVERY 8 HOURS AS NEEDED FOR NAUSEA     * ondansetron 8 MG tablet  Commonly known as: Zofran  Take 1 tablet (8 mg total) by mouth every 8 (eight) hours as needed for Nausea. pioglitazone 45 MG Tabs  Commonly known as: Actos  Take 1 tablet (45 mg total) by mouth daily. Rybelsus 14 MG Tabs  Generic drug: Semaglutide  Take 1 tablet by mouth daily. tolterodine ER 4 MG Cp24  Commonly known as: Detrol LA  Take 1 capsule (4 mg total) by mouth daily. traMADol 50 MG Tabs  Commonly known as: Ultram  1-2 tablets every 8 hrs prn pain           * This list has 2 medication(s) that are the same as other medications prescribed for you. Read the directions carefully, and ask your doctor or other care provider to review them with you. STOP taking these medications      cloNIDine 0.3 MG Tabs  Commonly known as: Catapres     Doxycycline Monohydrate 100 MG Caps  Commonly known as: MONODOX     fenofibrate 145 MG Tabs  Commonly known as: Tricor  Replaced by: fenofibrate micronized 67 MG Caps     Omeprazole 40 MG Cpdr     simvastatin 40 MG Tabs  Commonly known as: Zocor               Where to Get Your Medications        These medications were sent to [address], [facility]  [address]  [phone], [phone]  [address] [place] [address]      Phone: [phone]   aspirin 81 MG Tbec  carvedilol 12.5 MG Tabs  fenofibrate micronized 67 MG Caps  lisinopril 40 MG Tabs  pantoprazole 40 MG Tbec  rosuvastatin 40 MG Tabs  sucralfate 1 GM/10ML Susp  ticagrelor 90 MG Tabs

## 2023-05-09 NOTE — PLAN OF CARE
Patient alert and oriented. Ambulating independently in room.  at the bedside. Zofran given for nausea in the morning. Full liquid diet tolerated, advanced to soft diet. Patient informed that she needs to tolerate solid food to discharge. Plan to discharge home tomorrow once diet is tolerated and pain managed. Problem: Patient Centered Care  Goal: Patient preferences are identified and integrated in the patient's plan of care  Description: Interventions:  - What would you like us to know as we care for you?  I live with my family  - Provide timely, complete, and accurate information to patient/family  - Incorporate patient and family knowledge, values, beliefs, and cultural backgrounds into the planning and delivery of care  - Encourage patient/family to participate in care and decision-making at the level they choose  - Honor patient and family perspectives and choices  Outcome: Progressing     Problem: Diabetes/Glucose Control  Goal: Glucose maintained within prescribed range  Description: INTERVENTIONS:  - Monitor Blood Glucose as ordered  - Assess for signs and symptoms of hyperglycemia and hypoglycemia  - Administer ordered medications to maintain glucose within target range  - Assess barriers to adequate nutritional intake and initiate nutrition consult as needed  - Instruct patient on self management of diabetes  Outcome: Progressing     Problem: Patient/Family Goals  Goal: Patient/Family Long Term Goal  Description: Patient's Long Term Goal: to be relieved from pain    Interventions:  - Monitor vital signs  -Monitor pain  -PRN medications  - See additional Care Plan goals for specific interventions  Outcome: Progressing  Goal: Patient/Family Short Term Goal  Description: Patient's Short Term Goal: to go home    Interventions:   - Monitor vital signs  -Monitor pain  - See additional Care Plan goals for specific interventions  Outcome: Progressing     Problem: CARDIOVASCULAR - ADULT  Goal: Maintains optimal cardiac output and hemodynamic stability  Description: INTERVENTIONS:  - Monitor vital signs, rhythm, and trends  - Monitor for bleeding, hypotension and signs of decreased cardiac output  - Evaluate effectiveness of vasoactive medications to optimize hemodynamic stability  - Monitor arterial and/or venous puncture sites for bleeding and/or hematoma  - Assess quality of pulses, skin color and temperature  - Assess for signs of decreased coronary artery perfusion - ex.  Angina  - Evaluate fluid balance, assess for edema, trend weights  Outcome: Progressing  Goal: Absence of cardiac arrhythmias or at baseline  Description: INTERVENTIONS:  - Continuous cardiac monitoring, monitor vital signs, obtain 12 lead EKG if indicated  - Evaluate effectiveness of antiarrhythmic and heart rate control medications as ordered  - Initiate emergency measures for life threatening arrhythmias  - Monitor electrolytes and administer replacement therapy as ordered  Outcome: Progressing

## 2023-05-10 VITALS
WEIGHT: 170.88 LBS | SYSTOLIC BLOOD PRESSURE: 142 MMHG | BODY MASS INDEX: 35 KG/M2 | TEMPERATURE: 99 F | HEART RATE: 78 BPM | DIASTOLIC BLOOD PRESSURE: 77 MMHG | OXYGEN SATURATION: 96 % | RESPIRATION RATE: 18 BRPM

## 2023-05-10 LAB
ANION GAP SERPL CALC-SCNC: 7 MMOL/L (ref 0–18)
BASOPHILS # BLD AUTO: 0.02 X10(3) UL (ref 0–0.2)
BASOPHILS NFR BLD AUTO: 0.3 %
BUN BLD-MCNC: 17 MG/DL (ref 7–18)
BUN/CREAT SERPL: 14.2 (ref 10–20)
CALCIUM BLD-MCNC: 8.4 MG/DL (ref 8.5–10.1)
CHLORIDE SERPL-SCNC: 106 MMOL/L (ref 98–112)
CO2 SERPL-SCNC: 24 MMOL/L (ref 21–32)
CREAT BLD-MCNC: 1.2 MG/DL
DEPRECATED RDW RBC AUTO: 40.9 FL (ref 35.1–46.3)
DEPRECATED RDW RBC AUTO: 40.9 FL (ref 35.1–46.3)
EOSINOPHIL # BLD AUTO: 0.14 X10(3) UL (ref 0–0.7)
EOSINOPHIL NFR BLD AUTO: 1.9 %
ERYTHROCYTE [DISTWIDTH] IN BLOOD BY AUTOMATED COUNT: 13 % (ref 11–15)
ERYTHROCYTE [DISTWIDTH] IN BLOOD BY AUTOMATED COUNT: 13 % (ref 11–15)
GFR SERPLBLD BASED ON 1.73 SQ M-ARVRAT: 50 ML/MIN/1.73M2 (ref 60–?)
GLUCOSE BLD-MCNC: 158 MG/DL (ref 70–99)
GLUCOSE BLDC GLUCOMTR-MCNC: 122 MG/DL (ref 70–99)
GLUCOSE BLDC GLUCOMTR-MCNC: 185 MG/DL (ref 70–99)
GLUCOSE BLDC GLUCOMTR-MCNC: 226 MG/DL (ref 70–99)
HCT VFR BLD AUTO: 37.4 %
HCT VFR BLD AUTO: 37.4 %
HGB BLD-MCNC: 12.3 G/DL
HGB BLD-MCNC: 12.3 G/DL
IMM GRANULOCYTES # BLD AUTO: 0.03 X10(3) UL (ref 0–1)
IMM GRANULOCYTES NFR BLD: 0.4 %
LYMPHOCYTES # BLD AUTO: 0.89 X10(3) UL (ref 1–4)
LYMPHOCYTES NFR BLD AUTO: 11.9 %
MCH RBC QN AUTO: 28.5 PG (ref 26–34)
MCH RBC QN AUTO: 28.5 PG (ref 26–34)
MCHC RBC AUTO-ENTMCNC: 32.9 G/DL (ref 31–37)
MCHC RBC AUTO-ENTMCNC: 32.9 G/DL (ref 31–37)
MCV RBC AUTO: 86.6 FL
MCV RBC AUTO: 86.6 FL
MONOCYTES # BLD AUTO: 1.26 X10(3) UL (ref 0.1–1)
MONOCYTES NFR BLD AUTO: 16.8 %
NEUTROPHILS # BLD AUTO: 5.14 X10 (3) UL (ref 1.5–7.7)
NEUTROPHILS # BLD AUTO: 5.14 X10(3) UL (ref 1.5–7.7)
NEUTROPHILS NFR BLD AUTO: 68.7 %
OSMOLALITY SERPL CALC.SUM OF ELEC: 289 MOSM/KG (ref 275–295)
PHOSPHATE SERPL-MCNC: 2.5 MG/DL (ref 2.5–4.9)
PLATELET # BLD AUTO: 167 10(3)UL (ref 150–450)
PLATELET # BLD AUTO: 167 10(3)UL (ref 150–450)
POTASSIUM SERPL-SCNC: 3.5 MMOL/L (ref 3.5–5.1)
RBC # BLD AUTO: 4.32 X10(6)UL
RBC # BLD AUTO: 4.32 X10(6)UL
SODIUM SERPL-SCNC: 137 MMOL/L (ref 136–145)
WBC # BLD AUTO: 7.5 X10(3) UL (ref 4–11)
WBC # BLD AUTO: 7.5 X10(3) UL (ref 4–11)

## 2023-05-10 PROCEDURE — 99232 SBSQ HOSP IP/OBS MODERATE 35: CPT | Performed by: REGISTERED NURSE

## 2023-05-10 PROCEDURE — 99239 HOSP IP/OBS DSCHRG MGMT >30: CPT | Performed by: HOSPITALIST

## 2023-05-10 NOTE — PLAN OF CARE
Patient alert and oriented on room air with no complaints of nausea or pain overnight. No bowel movement overnight but scheduled miralax given. Call light in reach and safety measures implemented. Problem: Patient Centered Care  Goal: Patient preferences are identified and integrated in the patient's plan of care  Description: Interventions:  - What would you like us to know as we care for you?  I live with my family  - Provide timely, complete, and accurate information to patient/family  - Incorporate patient and family knowledge, values, beliefs, and cultural backgrounds into the planning and delivery of care  - Encourage patient/family to participate in care and decision-making at the level they choose  - Honor patient and family perspectives and choices  Outcome: Progressing     Problem: Diabetes/Glucose Control  Goal: Glucose maintained within prescribed range  Description: INTERVENTIONS:  - Monitor Blood Glucose as ordered  - Assess for signs and symptoms of hyperglycemia and hypoglycemia  - Administer ordered medications to maintain glucose within target range  - Assess barriers to adequate nutritional intake and initiate nutrition consult as needed  - Instruct patient on self management of diabetes  Outcome: Progressing     Problem: Patient/Family Goals  Goal: Patient/Family Long Term Goal  Description: Patient's Long Term Goal: to be relieved from pain    Interventions:  - Monitor vital signs  -Monitor pain  -PRN medications  - See additional Care Plan goals for specific interventions  Outcome: Progressing  Goal: Patient/Family Short Term Goal  Description: Patient's Short Term Goal: to go home    Interventions:   - Monitor vital signs  -Monitor pain  - See additional Care Plan goals for specific interventions  Outcome: Progressing     Problem: CARDIOVASCULAR - ADULT  Goal: Maintains optimal cardiac output and hemodynamic stability  Description: INTERVENTIONS:  - Monitor vital signs, rhythm, and trends  - Monitor for bleeding, hypotension and signs of decreased cardiac output  - Evaluate effectiveness of vasoactive medications to optimize hemodynamic stability  - Monitor arterial and/or venous puncture sites for bleeding and/or hematoma  - Assess quality of pulses, skin color and temperature  - Assess for signs of decreased coronary artery perfusion - ex.  Angina  - Evaluate fluid balance, assess for edema, trend weights  Outcome: Progressing  Goal: Absence of cardiac arrhythmias or at baseline  Description: INTERVENTIONS:  - Continuous cardiac monitoring, monitor vital signs, obtain 12 lead EKG if indicated  - Evaluate effectiveness of antiarrhythmic and heart rate control medications as ordered  - Initiate emergency measures for life threatening arrhythmias  - Monitor electrolytes and administer replacement therapy as ordered  Outcome: Progressing

## 2023-05-10 NOTE — SPIRITUAL CARE NOTE
Spiritual Care Visit Note    Writer report consulting with RN. No one at bedside. Writer offered support. Patient has no needs at this time. Writer left Spiritual Care card. Follow up as requested. Visited With: Patient    Spiritual Care Taxonomy:    Intended Effects: Establish rapport and connectedness    Methods: Collaborate with care team member;Offer support    Interventions: Active listening; Ask guided questions     Leslie Herman, [address] [facility]   [id number]     On call  services [id number]

## 2023-05-10 NOTE — DISCHARGE PLANNING
Communicated with patient using language line . Patient was provided with discharge instructions, education, and follow up information. Prescriptions were already sent electronically to patient's pharmacy. Brillinta coupon given at time of discharge. Patient verbalizes understanding of follow up information, specifically PCP, cardiology, and GI. Patient has no questions after reviewing all instructions and will be going home.      Bassam Leggett, Discharge Leader [id number]

## 2023-05-10 NOTE — PLAN OF CARE
Patient alert and oriented.  at the bedside in the evening. Tolerating diet, no complaints of pain or nausea. Cleared for discharge home. IV removed and tele discontinued. Discharge instructions reviewed with patient, questions answered, verbalized understanding.  will transport patient home. Problem: Patient Centered Care  Goal: Patient preferences are identified and integrated in the patient's plan of care  Description: Interventions:  - What would you like us to know as we care for you?  I live with my family  - Provide timely, complete, and accurate information to patient/family  - Incorporate patient and family knowledge, values, beliefs, and cultural backgrounds into the planning and delivery of care  - Encourage patient/family to participate in care and decision-making at the level they choose  - Honor patient and family perspectives and choices  Outcome: Completed     Problem: Diabetes/Glucose Control  Goal: Glucose maintained within prescribed range  Description: INTERVENTIONS:  - Monitor Blood Glucose as ordered  - Assess for signs and symptoms of hyperglycemia and hypoglycemia  - Administer ordered medications to maintain glucose within target range  - Assess barriers to adequate nutritional intake and initiate nutrition consult as needed  - Instruct patient on self management of diabetes  Outcome: Completed     Problem: Patient/Family Goals  Goal: Patient/Family Long Term Goal  Description: Patient's Long Term Goal: to be relieved from pain    Interventions:  - Monitor vital signs  -Monitor pain  -PRN medications  - See additional Care Plan goals for specific interventions  Outcome: Completed  Goal: Patient/Family Short Term Goal  Description: Patient's Short Term Goal: to go home    Interventions:   - Monitor vital signs  -Monitor pain  - See additional Care Plan goals for specific interventions  Outcome: Completed     Problem: CARDIOVASCULAR - ADULT  Goal: Maintains optimal cardiac output and hemodynamic stability  Description: INTERVENTIONS:  - Monitor vital signs, rhythm, and trends  - Monitor for bleeding, hypotension and signs of decreased cardiac output  - Evaluate effectiveness of vasoactive medications to optimize hemodynamic stability  - Monitor arterial and/or venous puncture sites for bleeding and/or hematoma  - Assess quality of pulses, skin color and temperature  - Assess for signs of decreased coronary artery perfusion - ex.  Angina  - Evaluate fluid balance, assess for edema, trend weights  Outcome: Completed  Goal: Absence of cardiac arrhythmias or at baseline  Description: INTERVENTIONS:  - Continuous cardiac monitoring, monitor vital signs, obtain 12 lead EKG if indicated  - Evaluate effectiveness of antiarrhythmic and heart rate control medications as ordered  - Initiate emergency measures for life threatening arrhythmias  - Monitor electrolytes and administer replacement therapy as ordered  Outcome: Completed

## 2023-05-10 NOTE — DISCHARGE SUMMARY
Dc summary#[id number]  > 30 min spent on [address] [facility] Discharge Diagnoses: nstemi/gastroparesis    Lace+ Score: 44  59-90 High Risk  29-58 Medium Risk  0-28   Low Risk. TCM Follow-Up Recommendation:  LACE > 58:  High Risk of readmission after discharge from the hospital.tcm fu recommended

## 2023-05-11 NOTE — TELEPHONE ENCOUNTER
[employer] # Lum Citron [id number]    I spoke to the pt with the help of the interpretor    We scheduled an appt.  Date time and [facility] location reviewed with the pt    Your Appointments           Wednesday May 24, 2023 11:00 AM  [facility] [address] [place] [address],[address], [address] [facility]  [phone]

## 2023-05-11 NOTE — PAYOR COMM NOTE
Discharge Notification    Patient Name: Simba Coreas  Payor: [facility]  Subscriber #: [id number]  Authorization Number: [id number]  Admit Date/Time: 5/2/2023 4:21 AM  Discharge Date/Time: 5/10/2023 6:36 PM

## 2023-05-11 NOTE — DISCHARGE SUMMARY
[facility]    PATIENT'S NAME: SHELIA CARVAJAL   ATTENDING PHYSICIAN: Jennifer Bell MD   PATIENT ACCOUNT#:   [id number]    LOCATION:  [facility] #:   [id number]       YOB: 1955  ADMISSION DATE:       05/02/2023      DISCHARGE DATE:  05/10/2023    DISCHARGE SUMMARY    About 45 minutes were spent preparing this discharge. DISCHARGE DIAGNOSES:    1.   Non-ST elevation myocardial infarction. 2.   Nausea and vomiting complicating that caused by gastroparesis. HISTORY AND HOSPITAL COURSE:  This is a very pleasant 72-year-old  American female who speaks some English who presents with a history of having abdominal pain. She has a history of hypertension and diabetes. Pain started when she was having dinner. She thought she had some gastritis, but got no relief with Protonix. She was admitted to the hospital and was found to have suspicious EKG, elevated troponins, and was seen by Cardiology who suspected a lateral wall MI with blockage in the circumflex artery. Dr. Dorys Hoyt performed a cardiac catheterization which actually revealed an LAD stenosis that was up to 95%. That was treated with stenting. The patient slowly improved. She did have issues with nausea and vomiting and was also evaluated by GI when she did not improve by Dr. Tina Castro who suggested it was effects from the non-ST elevation MI. The patient was very constipated and failed conservative therapy. So,she was given a GoLYTELY prep and her Protonix was increased and was told to avoid nonsteroidals. After she had a bowel movement, she significantly improved. Her nausea slowly abated. She felt much better and thought that there might be some gastroparesis component. Because of her recent MI, no EGD was contemplated. She was given symptomatic therapy and was able to eat and then go home after lunch yesterday with no significant pain.     PHYSICAL EXAMINATION:    VITAL SIGNS:  On discharge temperature 98.5, pulse 78, respiratory rate 18, blood pressure 142/77, 96%. LUNGS:  Occasional rhonchi. HEART:  Normal S1, S2. No S3.  ABDOMEN:  Soft, nontender. EXTREMITIES:  Without calf tenderness. NEUROLOGIC:  She is alert, oriented, friendly, and cooperative. LABORATORY STUDIES:  Please see chart. ASSESSMENT AND PLAN:    1.   Non-ST elevation myocardial infarction, status post left anterior descending artery stenting. Very little circumflex disease. The patient on medications. 2.   Catheter site bleeding. Now stable. 3.   Hypotension post angiogram.  Now well. 4.   Hypertension. Continue medication. Avoid clonidine. 5.   Nausea and vomiting from combination of possible ulcer disease, gastroparesis, and medication effects. Has been ongoing for 2 years. EGD when stable cardiac-wise. 6.   Dyslipidemia. 7.   Acute kidney injury on chronic kidney disease stage 3 from perhaps hypotension and nephrotoxic dye. Now near baseline. 8.   DVT prophylaxis. SCDs and heparin. CONDITION ON DISCHARGE:  Stable. CODE STATUS:  Not discussed during this hospitalization. DISCHARGE MEDICATIONS:    1. Farxiga 10 mg daily. 2.   Gabapentin 300 mg 3 times a day. 3.   Glucotrol 5 mg twice a day. 4.   Synthroid 200 mcg before breakfast.  5.   Meclizine 25 mg twice a day. 6.   Zofran 4 mg every 8 hours as needed for nausea. 7.   Actos 45 mg daily. 8.   Rybelsus 14 mg daily. 9.   Tolterodine 4 mg daily. 10.   Tramadol 50 mg every 8 hours as needed for pain. 11.   Fenofibrate 67 mg daily. Watch for muscle weakness. 12.   Lisinopril 40 mg daily. 13.   Ecotrin 81 mg daily with food. 14.   Coreg 12.5 mg twice a day. 15.   Rosuvastatin 40 mg nightly. Watch for severe muscle weakness and stop and call. 16.   Carafate 1 g 4 times a day as needed. 17.   Brilinta 90 mg twice a day. 18.   Protonix 40 mg twice a day. ACTIVITY:  No heavy exercise, otherwise as tolerated.     DIET:  Low fat, low salt, 2000-calorie ADA. I have no confidence that she will follow it. As she was discussing what her  was going to give her for dinner as she was being discharged, it did not sound heart-healthy. FOLLOWUP:  Dr. Shayan Mcleod in 1 week, Dr. Dipti Escamilla in 1 week, MAURO Angeles in 1 to 2 weeks. Follow up on H pylori results. Return if fevers, chills, sweats, nausea, vomiting, chest pain, shortness of breath, or other complaints. RISK OF READMISSION:  Very high. TCM followup recommended. Dictated By Cayla Mares.  MD Ray  d: 05/11/2023 08:57:42  t: 05/11/2023 09:39:36  Job [id number]  LAS/

## 2023-05-12 NOTE — CDS QUERY
Dr. Tee Esteban: . To answer this query: DON'T [address] [facility], 1st click on 3 dots to the RIGHT OF CO-SIGN Button AND CLICK EDIT.    2nd type an \"X\" in the bracket for the diagnosis that applies. (You may type in any additional clinical details you feel necessary to substantiate your response). 3rd Then Click on the Sign Button to complete your response. Thank you. Yesenia De Leon Heart Failure   CLINICAL DOCUMENTATION CLARIFICATION FORM  Dear Doctor Bell:  Clinical information (provided below) includes a diagnosis of Heart Failure. For accurate ICD-10-CM code assignment to reflect severity of illness and risk of mortality,  PLEASE (X) THE  DIAGNOSIS THAT APPLIES    (    ) Chronic Systolic Heart Failure (HFrEF)                   (    ) Other Type and Acuity of CHF,  please specify type and acuity:_______________________________  (   x ) Other  Explanation, please specify:_______acute on chronic diastolic chf____________________________________________      Documentation from the Medical Record:      23  H&P 74 yr old female with PMH of HTN, T2DM, Thyroid disease presented to the ED with abdominal pain with no relief from protonix. 23 Troponins: [id number]     23 Cardiac Cath: Left anterior descendin% proximal LAD stenosis at the bifurcation of first  diagonal which is subtotaled 95% with ENOC I flow. Mid LAD without obstructive disease, apical LAD 95% stenosis and tapers quickly. Successful IVUS guided PCI LAD-diagonal bifurcation, culprit likely ostium of the diagonal, significant plaque burden in the LAD as well. HFpEF: Presumably normal ejection fraction, LVEDP 20 mmHg. Recommendations:  ECHO, No diuretics today, follow urine output and creatinine, reassess tomorrow    23 Echo: 1. Left ventricle: The cavity size was normal.Wall thickness was mildly Increased. Systolic function was mildly reduced. The estimated ejection fraction was 45%, by visual assessment. Hypokinesis of the entire apical wall. Doppler parameters are consistent with abnormal left ventricular relaxation - grade 1 diastolic dysfunction. 2.Left atrium: The atrium was mildly to moderately dilated. Impressions:  No previous study was available for comparison. Cardiology Progress Notes 05/04/23 Through 05/9/23   NSTEMI (non-ST elevated myocardial infarction) [facility])  Status post LAD diagonal stenting x3. Residual circumflex and distal LAD  disease. Continue DAPT. Echo with apical WMA and EF 45%. HFrEF  EF 45% with diffuse apical hypokinesis. Continue lisinopril, can increase later  if creatinine stable. Increase metoprolol dose with tachycardia and  hypertension. Take farxiga at home for DM. Clinically euvolemic.    05/10/23 DCS: nstemi/gastroparesis    Treatment: Cardiologist consulted, Cardiac Cath and ECHO done, Tele    ______________________________________________________________________________________________ If you have any questions, please contact Clinical :  Solomon Kern RN at [phone]     Thank You.      THIS FORM IS A PERMANENT PART OF THE MEDICAL RECORD

## 2023-05-14 ENCOUNTER — HOSPITAL ENCOUNTER (INPATIENT)
Facility: HOSPITAL | Age: 68
LOS: 3 days | Discharge: HOME | End: 2023-05-17
Attending: EMERGENCY MEDICINE | Admitting: HOSPITALIST
Payer: MEDICARE

## 2023-05-14 ENCOUNTER — APPOINTMENT (OUTPATIENT)
Dept: GENERAL RADIOLOGY | Facility: HOSPITAL | Age: 68
End: 2023-05-14
Attending: EMERGENCY MEDICINE
Payer: MEDICARE

## 2023-05-14 ENCOUNTER — APPOINTMENT (OUTPATIENT)
Dept: CT IMAGING | Facility: HOSPITAL | Age: 68
End: 2023-05-14
Attending: EMERGENCY MEDICINE
Payer: MEDICARE

## 2023-05-14 DIAGNOSIS — R10.13 ABDOMINAL PAIN, EPIGASTRIC: Primary | ICD-10-CM

## 2023-05-14 DIAGNOSIS — R94.31 ABNORMAL EKG: ICD-10-CM

## 2023-05-14 LAB
ALBUMIN SERPL-MCNC: 3 G/DL (ref 3.4–5)
ALP LIVER SERPL-CCNC: 89 U/L
ALT SERPL-CCNC: 21 U/L
ANION GAP SERPL CALC-SCNC: 10 MMOL/L (ref 0–18)
ANTIBODY SCREEN: NEGATIVE
APTT PPP: 25 SECONDS (ref 23.3–35.6)
AST SERPL-CCNC: 31 U/L (ref 15–37)
BASOPHILS # BLD AUTO: 0.04 X10(3) UL (ref 0–0.2)
BASOPHILS NFR BLD AUTO: 0.5 %
BILIRUB DIRECT SERPL-MCNC: 0.2 MG/DL (ref 0–0.2)
BILIRUB SERPL-MCNC: 0.5 MG/DL (ref 0.1–2)
BILIRUB UR QL: NEGATIVE
BUN BLD-MCNC: 16 MG/DL (ref 7–18)
BUN/CREAT SERPL: 17.6 (ref 10–20)
CALCIUM BLD-MCNC: 8.8 MG/DL (ref 8.5–10.1)
CHLORIDE SERPL-SCNC: 112 MMOL/L (ref 98–112)
CHOLEST SERPL-MCNC: 89 MG/DL (ref ?–200)
CLARITY UR: CLEAR
CO2 SERPL-SCNC: 20 MMOL/L (ref 21–32)
COLOR UR: COLORLESS
CREAT BLD-MCNC: 0.91 MG/DL
DEPRECATED RDW RBC AUTO: 43.9 FL (ref 35.1–46.3)
EOSINOPHIL # BLD AUTO: 0.09 X10(3) UL (ref 0–0.7)
EOSINOPHIL NFR BLD AUTO: 1.2 %
ERYTHROCYTE [DISTWIDTH] IN BLOOD BY AUTOMATED COUNT: 13.5 % (ref 11–15)
GFR SERPLBLD BASED ON 1.73 SQ M-ARVRAT: 70 ML/MIN/1.73M2 (ref 60–?)
GLUCOSE BLD-MCNC: 100 MG/DL (ref 70–99)
GLUCOSE BLDC GLUCOMTR-MCNC: 134 MG/DL (ref 70–99)
GLUCOSE UR-MCNC: >1000 MG/DL
HCT VFR BLD AUTO: 41.5 %
HDLC SERPL-MCNC: 36 MG/DL (ref 40–59)
HGB BLD-MCNC: 13.2 G/DL
HGB UR QL STRIP.AUTO: NEGATIVE
IMM GRANULOCYTES # BLD AUTO: 0.02 X10(3) UL (ref 0–1)
IMM GRANULOCYTES NFR BLD: 0.3 %
INR BLD: 1.03 (ref 0.85–1.16)
KETONES UR-MCNC: NEGATIVE MG/DL
LDLC SERPL CALC-MCNC: 28 MG/DL (ref ?–100)
LEUKOCYTE ESTERASE UR QL STRIP.AUTO: NEGATIVE
LIPASE SERPL-CCNC: 53 U/L (ref 13–75)
LYMPHOCYTES # BLD AUTO: 1.13 X10(3) UL (ref 1–4)
LYMPHOCYTES NFR BLD AUTO: 14.5 %
MCH RBC QN AUTO: 28.3 PG (ref 26–34)
MCHC RBC AUTO-ENTMCNC: 31.8 G/DL (ref 31–37)
MCV RBC AUTO: 89.1 FL
MONOCYTES # BLD AUTO: 0.64 X10(3) UL (ref 0.1–1)
MONOCYTES NFR BLD AUTO: 8.2 %
NEUTROPHILS # BLD AUTO: 5.89 X10 (3) UL (ref 1.5–7.7)
NEUTROPHILS # BLD AUTO: 5.89 X10(3) UL (ref 1.5–7.7)
NEUTROPHILS NFR BLD AUTO: 75.3 %
NITRITE UR QL STRIP.AUTO: NEGATIVE
NONHDLC SERPL-MCNC: 53 MG/DL (ref ?–130)
OSMOLALITY SERPL CALC.SUM OF ELEC: 295 MOSM/KG (ref 275–295)
PH UR: 7 [PH] (ref 5–8)
PLATELET # BLD AUTO: 221 10(3)UL (ref 150–450)
POTASSIUM SERPL-SCNC: 3.2 MMOL/L (ref 3.5–5.1)
PROT SERPL-MCNC: 6.9 G/DL (ref 6.4–8.2)
PROT UR-MCNC: NEGATIVE MG/DL
PROTHROMBIN TIME: 13.4 SECONDS (ref 11.6–14.8)
RBC # BLD AUTO: 4.66 X10(6)UL
RH BLOOD TYPE: POSITIVE
SODIUM SERPL-SCNC: 142 MMOL/L (ref 136–145)
SP GR UR STRIP: 1.02 (ref 1–1.03)
TRIGL SERPL-MCNC: 149 MG/DL (ref 30–149)
TROPONIN I HIGH SENSITIVITY: 439 NG/L
TROPONIN I HIGH SENSITIVITY: 480 NG/L
TROPONIN I HIGH SENSITIVITY: 580 NG/L
UROBILINOGEN UR STRIP-ACNC: NORMAL
VLDLC SERPL CALC-MCNC: 20 MG/DL (ref 0–30)
WBC # BLD AUTO: 7.8 X10(3) UL (ref 4–11)

## 2023-05-14 PROCEDURE — 70450 CT HEAD/BRAIN W/O DYE: CPT | Performed by: EMERGENCY MEDICINE

## 2023-05-14 PROCEDURE — 71275 CT ANGIOGRAPHY CHEST: CPT | Performed by: EMERGENCY MEDICINE

## 2023-05-14 PROCEDURE — 99222 1ST HOSP IP/OBS MODERATE 55: CPT | Performed by: HOSPITALIST

## 2023-05-14 PROCEDURE — 71045 X-RAY EXAM CHEST 1 VIEW: CPT | Performed by: EMERGENCY MEDICINE

## 2023-05-14 PROCEDURE — 74175 CTA ABDOMEN W/CONTRAST: CPT | Performed by: EMERGENCY MEDICINE

## 2023-05-14 RX ORDER — MECLIZINE HYDROCHLORIDE 25 MG/1
25 TABLET ORAL 3 TIMES DAILY PRN
Status: DISCONTINUED | OUTPATIENT
Start: 2023-05-14 | End: 2023-05-17

## 2023-05-14 RX ORDER — FENOFIBRATE 67 MG/1
67 CAPSULE ORAL
Status: DISCONTINUED | OUTPATIENT
Start: 2023-05-15 | End: 2023-05-17

## 2023-05-14 RX ORDER — ACETAMINOPHEN 500 MG
500 TABLET ORAL EVERY 4 HOURS PRN
Status: DISCONTINUED | OUTPATIENT
Start: 2023-05-14 | End: 2023-05-17

## 2023-05-14 RX ORDER — ASPIRIN 81 MG/1
324 TABLET, CHEWABLE ORAL ONCE
Status: COMPLETED | OUTPATIENT
Start: 2023-05-14 | End: 2023-05-14

## 2023-05-14 RX ORDER — METOCLOPRAMIDE HYDROCHLORIDE 5 MG/ML
10 INJECTION INTRAMUSCULAR; INTRAVENOUS EVERY 8 HOURS PRN
Status: DISCONTINUED | OUTPATIENT
Start: 2023-05-14 | End: 2023-05-17

## 2023-05-14 RX ORDER — SODIUM CHLORIDE 9 MG/ML
INJECTION, SOLUTION INTRAVENOUS CONTINUOUS
Status: DISCONTINUED | OUTPATIENT
Start: 2023-05-14 | End: 2023-05-15

## 2023-05-14 RX ORDER — BISACODYL 10 MG
10 SUPPOSITORY, RECTAL RECTAL
Status: DISCONTINUED | OUTPATIENT
Start: 2023-05-14 | End: 2023-05-17

## 2023-05-14 RX ORDER — HYDRALAZINE HYDROCHLORIDE 20 MG/ML
15 INJECTION INTRAMUSCULAR; INTRAVENOUS
Status: DISCONTINUED | OUTPATIENT
Start: 2023-05-14 | End: 2023-05-17

## 2023-05-14 RX ORDER — ONDANSETRON 2 MG/ML
4 INJECTION INTRAMUSCULAR; INTRAVENOUS ONCE
Status: DISCONTINUED | OUTPATIENT
Start: 2023-05-14 | End: 2023-05-14

## 2023-05-14 RX ORDER — HYDROCODONE BITARTRATE AND ACETAMINOPHEN 5; 325 MG/1; MG/1
1 TABLET ORAL EVERY 4 HOURS PRN
Status: DISCONTINUED | OUTPATIENT
Start: 2023-05-14 | End: 2023-05-17

## 2023-05-14 RX ORDER — GABAPENTIN 300 MG/1
300 CAPSULE ORAL 3 TIMES DAILY
Status: DISCONTINUED | OUTPATIENT
Start: 2023-05-14 | End: 2023-05-17

## 2023-05-14 RX ORDER — ONDANSETRON 2 MG/ML
4 INJECTION INTRAMUSCULAR; INTRAVENOUS EVERY 6 HOURS PRN
Status: DISCONTINUED | OUTPATIENT
Start: 2023-05-14 | End: 2023-05-17

## 2023-05-14 RX ORDER — MORPHINE SULFATE 4 MG/ML
4 INJECTION, SOLUTION INTRAMUSCULAR; INTRAVENOUS EVERY 2 HOUR PRN
Status: DISCONTINUED | OUTPATIENT
Start: 2023-05-14 | End: 2023-05-17

## 2023-05-14 RX ORDER — HYDRALAZINE HYDROCHLORIDE 20 MG/ML
20 INJECTION INTRAMUSCULAR; INTRAVENOUS ONCE
Status: COMPLETED | OUTPATIENT
Start: 2023-05-14 | End: 2023-05-14

## 2023-05-14 RX ORDER — PROCHLORPERAZINE EDISYLATE 5 MG/ML
10 INJECTION INTRAMUSCULAR; INTRAVENOUS ONCE
Status: DISCONTINUED | OUTPATIENT
Start: 2023-05-14 | End: 2023-05-17

## 2023-05-14 RX ORDER — MELATONIN
3 NIGHTLY PRN
Status: DISCONTINUED | OUTPATIENT
Start: 2023-05-14 | End: 2023-05-17

## 2023-05-14 RX ORDER — DILTIAZEM HYDROCHLORIDE 5 MG/ML
20 INJECTION INTRAVENOUS ONCE
Status: DISCONTINUED | OUTPATIENT
Start: 2023-05-14 | End: 2023-05-14

## 2023-05-14 RX ORDER — LISINOPRIL 20 MG/1
40 TABLET ORAL DAILY
Status: DISCONTINUED | OUTPATIENT
Start: 2023-05-15 | End: 2023-05-16

## 2023-05-14 RX ORDER — POLYETHYLENE GLYCOL 3350 17 G/17G
17 POWDER, FOR SOLUTION ORAL DAILY PRN
Status: DISCONTINUED | OUTPATIENT
Start: 2023-05-14 | End: 2023-05-17

## 2023-05-14 RX ORDER — NICOTINE POLACRILEX 4 MG
15 LOZENGE BUCCAL
Status: DISCONTINUED | OUTPATIENT
Start: 2023-05-14 | End: 2023-05-17

## 2023-05-14 RX ORDER — SENNOSIDES 8.6 MG
17.2 TABLET ORAL NIGHTLY PRN
Status: DISCONTINUED | OUTPATIENT
Start: 2023-05-14 | End: 2023-05-17

## 2023-05-14 RX ORDER — ACETAMINOPHEN 325 MG/1
650 TABLET ORAL EVERY 4 HOURS PRN
Status: DISCONTINUED | OUTPATIENT
Start: 2023-05-14 | End: 2023-05-17

## 2023-05-14 RX ORDER — DEXTROSE MONOHYDRATE 25 G/50ML
50 INJECTION, SOLUTION INTRAVENOUS
Status: DISCONTINUED | OUTPATIENT
Start: 2023-05-14 | End: 2023-05-17

## 2023-05-14 RX ORDER — PANTOPRAZOLE SODIUM 40 MG/1
40 TABLET, DELAYED RELEASE ORAL
Status: DISCONTINUED | OUTPATIENT
Start: 2023-05-15 | End: 2023-05-17

## 2023-05-14 RX ORDER — HYDRALAZINE HYDROCHLORIDE 20 MG/ML
10 INJECTION INTRAMUSCULAR; INTRAVENOUS
Status: DISCONTINUED | OUTPATIENT
Start: 2023-05-14 | End: 2023-05-14

## 2023-05-14 RX ORDER — SUCRALFATE ORAL 1 G/10ML
1 SUSPENSION ORAL
Status: DISCONTINUED | OUTPATIENT
Start: 2023-05-14 | End: 2023-05-17

## 2023-05-14 RX ORDER — ENEMA 19; 7 G/133ML; G/133ML
1 ENEMA RECTAL ONCE AS NEEDED
Status: DISCONTINUED | OUTPATIENT
Start: 2023-05-14 | End: 2023-05-17

## 2023-05-14 RX ORDER — MORPHINE SULFATE 4 MG/ML
4 INJECTION, SOLUTION INTRAMUSCULAR; INTRAVENOUS ONCE
Status: COMPLETED | OUTPATIENT
Start: 2023-05-14 | End: 2023-05-14

## 2023-05-14 RX ORDER — TRAMADOL HYDROCHLORIDE 50 MG/1
50 TABLET ORAL EVERY 6 HOURS PRN
Status: DISCONTINUED | OUTPATIENT
Start: 2023-05-14 | End: 2023-05-17

## 2023-05-14 RX ORDER — ASPIRIN 81 MG/1
81 TABLET ORAL DAILY
Status: DISCONTINUED | OUTPATIENT
Start: 2023-05-15 | End: 2023-05-17

## 2023-05-14 RX ORDER — BENZONATATE 200 MG/1
200 CAPSULE ORAL 3 TIMES DAILY PRN
Status: DISCONTINUED | OUTPATIENT
Start: 2023-05-14 | End: 2023-05-17

## 2023-05-14 RX ORDER — LABETALOL HYDROCHLORIDE 5 MG/ML
20 INJECTION, SOLUTION INTRAVENOUS ONCE
Status: COMPLETED | OUTPATIENT
Start: 2023-05-14 | End: 2023-05-14

## 2023-05-14 RX ORDER — CARVEDILOL 12.5 MG/1
12.5 TABLET ORAL 2 TIMES DAILY WITH MEALS
Status: DISCONTINUED | OUTPATIENT
Start: 2023-05-14 | End: 2023-05-16

## 2023-05-14 RX ORDER — HEPARIN SODIUM 1000 [USP'U]/ML
60 INJECTION, SOLUTION INTRAVENOUS; SUBCUTANEOUS ONCE
Status: COMPLETED | OUTPATIENT
Start: 2023-05-14 | End: 2023-05-14

## 2023-05-14 RX ORDER — HEPARIN SODIUM AND DEXTROSE 10000; 5 [USP'U]/100ML; G/100ML
12 INJECTION INTRAVENOUS ONCE
Status: COMPLETED | OUTPATIENT
Start: 2023-05-14 | End: 2023-05-14

## 2023-05-14 RX ORDER — FAMOTIDINE 10 MG/ML
20 INJECTION, SOLUTION INTRAVENOUS ONCE
Status: COMPLETED | OUTPATIENT
Start: 2023-05-14 | End: 2023-05-14

## 2023-05-14 RX ORDER — LEVOTHYROXINE SODIUM 0.1 MG/1
200 TABLET ORAL
Status: DISCONTINUED | OUTPATIENT
Start: 2023-05-15 | End: 2023-05-17

## 2023-05-14 RX ORDER — AMOXICILLIN AND CLAVULANATE POTASSIUM 875; 125 MG/1; MG/1
1 TABLET, FILM COATED ORAL 2 TIMES DAILY
Status: DISCONTINUED | OUTPATIENT
Start: 2023-05-14 | End: 2023-05-17

## 2023-05-14 RX ORDER — OXYBUTYNIN CHLORIDE 5 MG/1
5 TABLET, EXTENDED RELEASE ORAL DAILY
Status: DISCONTINUED | OUTPATIENT
Start: 2023-05-15 | End: 2023-05-17

## 2023-05-14 RX ORDER — ROSUVASTATIN CALCIUM 20 MG/1
40 TABLET, COATED ORAL NIGHTLY
Status: DISCONTINUED | OUTPATIENT
Start: 2023-05-14 | End: 2023-05-17

## 2023-05-14 RX ORDER — LABETALOL HYDROCHLORIDE 5 MG/ML
30 INJECTION, SOLUTION INTRAVENOUS ONCE
Status: COMPLETED | OUTPATIENT
Start: 2023-05-14 | End: 2023-05-14

## 2023-05-14 RX ORDER — MORPHINE SULFATE 2 MG/ML
2 INJECTION, SOLUTION INTRAMUSCULAR; INTRAVENOUS EVERY 2 HOUR PRN
Status: DISCONTINUED | OUTPATIENT
Start: 2023-05-14 | End: 2023-05-17

## 2023-05-14 RX ORDER — NICOTINE POLACRILEX 4 MG
30 LOZENGE BUCCAL
Status: DISCONTINUED | OUTPATIENT
Start: 2023-05-14 | End: 2023-05-17

## 2023-05-14 RX ORDER — HYDROCODONE BITARTRATE AND ACETAMINOPHEN 5; 325 MG/1; MG/1
2 TABLET ORAL EVERY 4 HOURS PRN
Status: DISCONTINUED | OUTPATIENT
Start: 2023-05-14 | End: 2023-05-17

## 2023-05-14 RX ORDER — HEPARIN SODIUM AND DEXTROSE 10000; 5 [USP'U]/100ML; G/100ML
INJECTION INTRAVENOUS CONTINUOUS
Status: DISCONTINUED | OUTPATIENT
Start: 2023-05-15 | End: 2023-05-15

## 2023-05-15 ENCOUNTER — APPOINTMENT (OUTPATIENT)
Dept: INTERVENTIONAL RADIOLOGY/VASCULAR | Facility: HOSPITAL | Age: 68
End: 2023-05-15
Attending: NURSE PRACTITIONER
Payer: MEDICARE

## 2023-05-15 LAB
ALBUMIN SERPL-MCNC: 2.8 G/DL (ref 3.4–5)
ALBUMIN/GLOB SERPL: 0.7 {RATIO} (ref 1–2)
ALP LIVER SERPL-CCNC: 85 U/L
ALT SERPL-CCNC: 22 U/L
ANION GAP SERPL CALC-SCNC: 6 MMOL/L (ref 0–18)
APTT PPP: 43.5 SECONDS (ref 23.3–35.6)
APTT PPP: 77 SECONDS (ref 23.3–35.6)
AST SERPL-CCNC: 32 U/L (ref 15–37)
ATRIAL RATE: 74 BPM
ATRIAL RATE: 84 BPM
BASOPHILS # BLD AUTO: 0.04 X10(3) UL (ref 0–0.2)
BASOPHILS NFR BLD AUTO: 0.4 %
BILIRUB SERPL-MCNC: 0.4 MG/DL (ref 0.1–2)
BUN BLD-MCNC: 16 MG/DL (ref 7–18)
BUN/CREAT SERPL: 18.6 (ref 10–20)
CALCIUM BLD-MCNC: 8.5 MG/DL (ref 8.5–10.1)
CHLORIDE SERPL-SCNC: 115 MMOL/L (ref 98–112)
CO2 SERPL-SCNC: 20 MMOL/L (ref 21–32)
CREAT BLD-MCNC: 0.86 MG/DL
DEPRECATED RDW RBC AUTO: 42.5 FL (ref 35.1–46.3)
EOSINOPHIL # BLD AUTO: 0 X10(3) UL (ref 0–0.7)
EOSINOPHIL NFR BLD AUTO: 0 %
ERYTHROCYTE [DISTWIDTH] IN BLOOD BY AUTOMATED COUNT: 13.6 % (ref 11–15)
EST. AVERAGE GLUCOSE BLD GHB EST-MCNC: 180 MG/DL (ref 68–126)
GFR SERPLBLD BASED ON 1.73 SQ M-ARVRAT: 75 ML/MIN/1.73M2 (ref 60–?)
GLOBULIN PLAS-MCNC: 3.9 G/DL (ref 2.8–4.4)
GLUCOSE BLD-MCNC: 48 MG/DL (ref 70–99)
GLUCOSE BLDC GLUCOMTR-MCNC: 130 MG/DL (ref 70–99)
GLUCOSE BLDC GLUCOMTR-MCNC: 185 MG/DL (ref 70–99)
GLUCOSE BLDC GLUCOMTR-MCNC: 203 MG/DL (ref 70–99)
GLUCOSE BLDC GLUCOMTR-MCNC: 55 MG/DL (ref 70–99)
GLUCOSE BLDC GLUCOMTR-MCNC: 67 MG/DL (ref 70–99)
GLUCOSE BLDC GLUCOMTR-MCNC: 93 MG/DL (ref 70–99)
HBA1C MFR BLD: 7.9 % (ref ?–5.7)
HCT VFR BLD AUTO: 36.6 %
HGB BLD-MCNC: 11.9 G/DL
IMM GRANULOCYTES # BLD AUTO: 0.05 X10(3) UL (ref 0–1)
IMM GRANULOCYTES NFR BLD: 0.5 %
LYMPHOCYTES # BLD AUTO: 1.3 X10(3) UL (ref 1–4)
LYMPHOCYTES NFR BLD AUTO: 11.7 %
MAGNESIUM SERPL-MCNC: 2 MG/DL (ref 1.6–2.6)
MCH RBC QN AUTO: 28.1 PG (ref 26–34)
MCHC RBC AUTO-ENTMCNC: 32.5 G/DL (ref 31–37)
MCV RBC AUTO: 86.3 FL
MONOCYTES # BLD AUTO: 1.19 X10(3) UL (ref 0.1–1)
MONOCYTES NFR BLD AUTO: 10.7 %
NEUTROPHILS # BLD AUTO: 8.49 X10 (3) UL (ref 1.5–7.7)
NEUTROPHILS # BLD AUTO: 8.49 X10(3) UL (ref 1.5–7.7)
NEUTROPHILS NFR BLD AUTO: 76.7 %
OSMOLALITY SERPL CALC.SUM OF ELEC: 290 MOSM/KG (ref 275–295)
P AXIS: 58 DEGREES
P AXIS: 65 DEGREES
P-R INTERVAL: 168 MS
P-R INTERVAL: 172 MS
PHOSPHATE SERPL-MCNC: 2.2 MG/DL (ref 2.5–4.9)
PLATELET # BLD AUTO: 265 10(3)UL (ref 150–450)
POTASSIUM SERPL-SCNC: 3.2 MMOL/L (ref 3.5–5.1)
POTASSIUM SERPL-SCNC: 3.2 MMOL/L (ref 3.5–5.1)
PROT SERPL-MCNC: 6.7 G/DL (ref 6.4–8.2)
Q-T INTERVAL: 436 MS
Q-T INTERVAL: 482 MS
QRS DURATION: 88 MS
QRS DURATION: 94 MS
QTC CALCULATION (BEZET): 515 MS
QTC CALCULATION (BEZET): 535 MS
R AXIS: -18 DEGREES
R AXIS: 3 DEGREES
RBC # BLD AUTO: 4.24 X10(6)UL
SODIUM SERPL-SCNC: 141 MMOL/L (ref 136–145)
T AXIS: 218 DEGREES
T AXIS: 251 DEGREES
VENTRICULAR RATE: 74 BPM
VENTRICULAR RATE: 84 BPM
WBC # BLD AUTO: 11.1 X10(3) UL (ref 4–11)

## 2023-05-15 PROCEDURE — B2111ZZ FLUOROSCOPY OF MULTIPLE CORONARY ARTERIES USING LOW OSMOLAR CONTRAST: ICD-10-PCS | Performed by: INTERNAL MEDICINE

## 2023-05-15 PROCEDURE — 4A023N7 MEASUREMENT OF CARDIAC SAMPLING AND PRESSURE, LEFT HEART, PERCUTANEOUS APPROACH: ICD-10-PCS | Performed by: INTERNAL MEDICINE

## 2023-05-15 PROCEDURE — B2151ZZ FLUOROSCOPY OF LEFT HEART USING LOW OSMOLAR CONTRAST: ICD-10-PCS | Performed by: INTERNAL MEDICINE

## 2023-05-15 PROCEDURE — 99233 SBSQ HOSP IP/OBS HIGH 50: CPT | Performed by: HOSPITALIST

## 2023-05-15 RX ORDER — HEPARIN SODIUM 1000 [USP'U]/ML
INJECTION, SOLUTION INTRAVENOUS; SUBCUTANEOUS
Status: COMPLETED
Start: 2023-05-15 | End: 2023-05-15

## 2023-05-15 RX ORDER — CHLORHEXIDINE GLUCONATE 4 G/100ML
30 SOLUTION TOPICAL
Status: ACTIVE | OUTPATIENT
Start: 2023-05-16 | End: 2023-05-16

## 2023-05-15 RX ORDER — METOPROLOL TARTRATE 5 MG/5ML
5 INJECTION INTRAVENOUS EVERY 4 HOURS PRN
Status: DISCONTINUED | OUTPATIENT
Start: 2023-05-15 | End: 2023-05-17

## 2023-05-15 RX ORDER — LIDOCAINE HYDROCHLORIDE 20 MG/ML
INJECTION, SOLUTION EPIDURAL; INFILTRATION; INTRACAUDAL; PERINEURAL
Status: COMPLETED
Start: 2023-05-15 | End: 2023-05-15

## 2023-05-15 RX ORDER — LACTULOSE 10 G/15ML
20 SOLUTION ORAL EVERY 6 HOURS PRN
Status: DISCONTINUED | OUTPATIENT
Start: 2023-05-15 | End: 2023-05-17

## 2023-05-15 RX ORDER — SODIUM CHLORIDE 9 MG/ML
INJECTION, SOLUTION INTRAVENOUS
Status: ACTIVE | OUTPATIENT
Start: 2023-05-16 | End: 2023-05-16

## 2023-05-15 RX ORDER — CHLORHEXIDINE GLUCONATE 4 G/100ML
30 SOLUTION TOPICAL
Status: COMPLETED | OUTPATIENT
Start: 2023-05-15 | End: 2023-05-15

## 2023-05-15 RX ORDER — MIDAZOLAM HYDROCHLORIDE 1 MG/ML
INJECTION INTRAMUSCULAR; INTRAVENOUS
Status: COMPLETED
Start: 2023-05-15 | End: 2023-05-15

## 2023-05-15 RX ORDER — VERAPAMIL HYDROCHLORIDE 2.5 MG/ML
INJECTION, SOLUTION INTRAVENOUS
Status: COMPLETED
Start: 2023-05-15 | End: 2023-05-15

## 2023-05-15 RX ORDER — DEXTROSE AND SODIUM CHLORIDE 5; .45 G/100ML; G/100ML
INJECTION, SOLUTION INTRAVENOUS CONTINUOUS
Status: DISCONTINUED | OUTPATIENT
Start: 2023-05-15 | End: 2023-05-17

## 2023-05-15 RX ORDER — SODIUM CHLORIDE 9 MG/ML
INJECTION, SOLUTION INTRAVENOUS
Status: COMPLETED | OUTPATIENT
Start: 2023-05-16 | End: 2023-05-16

## 2023-05-15 NOTE — PROGRESS NOTES
Pt admitted into  203 accompanied by E.R staff . Her  is at the bedside bedside. She has a lot of nausea and has had an emesis. Zofran will be given. Chema Madrigal

## 2023-05-15 NOTE — CDS QUERY
.How to Answer this Query:     1.) DON'T CLICK COSIGN BUTTON FIRST  1.) Click \"3 dots. Ayaan Morales \" to the right of cosign button and click EDIT on the toolbar  2.) Type an \"X\" in the bracket for the diagnosis that applies. (You may also add additional clinical details as you feel necessary to substantiate your response). 3.) Finally click \"Sign\" to complete response. Thank you    . Aspiration Pneumonia  -Present on Admission (POA)  Indy Mendoza    Dear Doctor Ray:  Clinical information (provided below) does not conclusively specify if the condition is Present On Admission (POA). In order to apply the POA indicator to the final set of ICD-10-CM diagnosis codes that reflects severity of illness and risk of mortality,  SELECTION BY PROVIDER ONLY  IS ASPIRATION PNEUMONIA PRESENT ON ADMISSION (POA)?      ( )  Yes, it is present on admission. ( )  No, it is not present on admission. ( x)  Other (please specify): was present prev admit now completing tx    ( )  Unable to Determine (please comment)        DocRisk Factors:  * Dr. Diego Carranza -H&P (5/14/23): \"Significant for diabetes, hypertension and thyroid      disease\"     Clinical Indicators:  * Dr. Diego Carranza  (5/15/23): \"Possible Asp Pneumonia\"  * CTA Chest Abdomen:    CONCLUSION:     1. No thoracoabdominal aneurysmal dilatation or aortic        dissection. Mild ascending aorta ectasia measuring 3.6 x 3.6     cm. No vascular dissection. 2. Extensive coronary artery disease involving the LAD and 1st     branch. 3. Left renal cortical scarring and atrophic changes. Severe     left renal artery origin stenosis > 70%. 4. Hepatic steatosis. No focal hepatic lesions. 5. Small hiatal hernia. 6. Moderate stool throughout the colon consistent with     constipation/fecal retention. 7. Small perivascular and peripheral subpleural nodules left     upper lobe.        8. Small granulomatous calcification left upper lobe    * WBC: 11.1      Treatment:  *  Dr. Evie Castro  (5/15/23): \"finish augmentin 5/18\"    If you have any questions, please contact Clinical :  Emily Mesa RN at [phone]     Thank You!     THIS FORM IS A PERMANENT PART OF THE MEDICAL RECORD  '

## 2023-05-15 NOTE — CDS QUERY
.How to Answer this Query:     1.) DON'T CLICK COSIGN BUTTON FIRST  1.) Click \"3 dots. Lester Coke Lester Coke \" to the right of cosign button and click EDIT on the toolbar  2.) Type an \"X\" in the bracket for the diagnosis that applies. (You may also add additional clinical details as you feel necessary to substantiate your response). 3.) Finally click \"Sign\" to complete response. Thank you    Clarification    - NSTEMI Ruled Out  CLINICAL DOCUMENTATION CLARIFICATION FORM     Dear Doctor Bell:  Please clarify if NSTEMI was ruled out     PLEASE (X) ALL DIAGNOSES THAT APPLY. THIS SECTION FOR PROVIDER DOCUMENTATION ONLY       (  )  Yes, NSTEMI was ruled out     (  )  No, NSTEMI was not ruled out and was present and treated during hospitalization    (  x)  Other (please specify):  I believe her symptoms were caused by cardiac ischemia; not certain if mstemi completely ruled in      Documentation from Medical Records:  Risk Factors:  * Dr. Pooja CosmeH&P (5/14/23): \"Significatn for diabetes, hypertension and thyroid      disease\"    Clinical Indicators:  * Dr. Pooja MARTINS (5/14/23): \"Admitting Diagnosis: Possible non-ST elevation     myocardial infarction vs a metabolic issue. Possible recurrent cardiac ischemia. Patient     just recently had an echo. Not reorder. Abdominal pain, nausea and vomiting\"  * Troponin I: 480 - 439 - 580  * EKG:  T wave inversions noted in lead V2 through V6, lead II. Similar to previous      EKG today  * Dr. Brianna Coe (5/15/23): Reason for Consultation:  CAD, elevated troponins. Abdominal pain, nausea, vomiting - she had chest pain last admission with     acute MI, but nausea and vomiting were pronounced at that time. Trop's      unimpressive but EKG with marked anterior changes. * ECHO\"  Left ventricle: The cavity size was normal. Wall thickness was mildly increased. Systolic function was mildly reduced. The estimated ejection fraction was 45%, by visual assessment.  Hypokinesis of the entireapical wall. Doppler parameters are consistent with abnormal left ventricular   relaxation - grade 1 diastolic dysfunction. Treatment:  *  Dr. Valdemar Essex (5/15/23): \"Procedure Performed: LHC and COR. - Indication: Elevated troponin, recent interventional LAD. - Summary of findings: Widely patent stent in the LAD and diagonal.  Severe        diffuse disease of distal LAD, distal circumflex and distal RCA which is not        amenable to PCI\"  * Dr. Alaina Montoya (5/15/23): \"Continue Heparin\"    If you have any questions, please contact Clinical :  Darren Campos RN at [phone]     Thank You!     THIS FORM IS A PERMANENT PART OF THE MEDICAL RECORD

## 2023-05-15 NOTE — PLAN OF CARE
Mrs Adam Tavarez arrived with n/v and abd pain. She was given  Zofran and Reglan. When her N/V subsided she was given 1  Norco and a Melatonin . She is on a Heparin gtt at 750 units /hour. Her low K+ level has been treated and labs will be drawn in A. M. Her B.P was elevated and she received a dose of Hydralazine. She has not had any chest pain. Her last troponin was 580 and cardiology was notified. Currently She is sleeping and when she wakes she denies any c/o. Problem: Patient Centered Care  Goal: Patient preferences are identified and integrated in the patient's plan of care  Description: Interventions:  - What would you like us to know as we care for you?  - Provide timely, complete, and accurate information to patient/family  - Incorporate patient and family knowledge, values, beliefs, and cultural backgrounds into the planning and delivery of care  - Encourage patient/family to participate in care and decision-making at the level they choose  - Honor patient and family perspectives and choices  Outcome: Progressing     Problem: CARDIOVASCULAR - ADULT  Goal: Maintains optimal cardiac output and hemodynamic stability  Description: INTERVENTIONS:  - Monitor vital signs, rhythm, and trends  - Monitor for bleeding, hypotension and signs of decreased cardiac output  - Evaluate effectiveness of vasoactive medications to optimize hemodynamic stability  - Monitor arterial and/or venous puncture sites for bleeding and/or hematoma  - Assess quality of pulses, skin color and temperature  - Assess for signs of decreased coronary artery perfusion - ex.  Angina  - Evaluate fluid balance, assess for edema, trend weights  Outcome: Progressing  Goal: Absence of cardiac arrhythmias or at baseline  Description: INTERVENTIONS:  - Continuous cardiac monitoring, monitor vital signs, obtain 12 lead EKG if indicated  - Evaluate effectiveness of antiarrhythmic and heart rate control medications as ordered  - Initiate emergency measures for life threatening arrhythmias  - Monitor electrolytes and administer replacement therapy as ordered  Outcome: Progressing     Problem: GASTROINTESTINAL - ADULT  Goal: Minimal or absence of nausea and vomiting  Description: INTERVENTIONS:  - Maintain adequate hydration with IV or PO as ordered and tolerated  - Nasogastric tube to low intermittent suction as ordered  - Evaluate effectiveness of ordered antiemetic medications  - Provide nonpharmacologic comfort measures as appropriate  - Advance diet as tolerated, if ordered  - Obtain nutritional consult as needed  - Evaluate fluid balance  Outcome: Progressing  Goal: Maintains or returns to baseline bowel function  Description: INTERVENTIONS:  - Assess bowel function  - Maintain adequate hydration with IV or PO as ordered and tolerated  - Evaluate effectiveness of GI medications  - Encourage mobilization and activity  - Obtain nutritional consult as needed  - Establish a toileting routine/schedule  - Consider collaborating with pharmacy to review patient's medication profile  Outcome: Progressing  Goal: Maintains adequate nutritional intake (undernourished)  Description: INTERVENTIONS:  - Monitor percentage of each meal consumed  - Identify factors contributing to decreased intake, treat as appropriate  - Assist with meals as needed  - Monitor I&O, WT and lab values  - Obtain nutritional consult as needed  - Optimize oral hygiene and moisture  - Encourage food from home; allow for food preferences  - Enhance eating environment  Outcome: Progressing     Problem: HEMATOLOGIC - ADULT  Goal: Maintains hematologic stability  Description: INTERVENTIONS  - Assess for signs and symptoms of bleeding or hemorrhage  - Monitor labs and vital signs for trends  - Administer supportive blood products/factors, fluids and medications as ordered and appropriate  - Administer supportive blood products/factors as ordered and appropriate  Outcome: Progressing     Problem: PAIN - ADULT  Goal: Verbalizes/displays adequate comfort level or patient's stated pain goal  Description: INTERVENTIONS:  - Encourage pt to monitor pain and request assistance  - Assess pain using appropriate pain scale  - Administer analgesics based on type and severity of pain and evaluate response  - Implement non-pharmacological measures as appropriate and evaluate response  - Consider cultural and social influences on pain and pain management  - Manage/alleviate anxiety  - Utilize distraction and/or relaxation techniques  - Monitor for opioid side effects  - Notify MD/LIP if interventions unsuccessful or patient reports new pain  - Anticipate increased pain with activity and pre-medicate as appropriate  Outcome: Progressing     Problem: SAFETY ADULT - FALL  Goal: Free from fall injury  Description: INTERVENTIONS:  - Assess pt frequently for physical needs  - Identify cognitive and physical deficits and behaviors that affect risk of falls.   - [place] fall precautions as indicated by assessment.  - Educate pt/family on patient safety including physical limitations  - Instruct pt to call for assistance with activity based on assessment  - Modify environment to reduce risk of injury  - Provide assistive devices as appropriate  - Consider OT/PT consult to assist with strengthening/mobility  - Encourage toileting schedule  Outcome: Progressing

## 2023-05-15 NOTE — H&P
[facility]    PATIENT'S NAME: Melba Acuña PHYSICIAN: Hola Hughes MD   PATIENT ACCOUNT#:   [id number]    LOCATION:  [address]  MEDICAL RECORD #:   [id number]       YOB: 1955  ADMISSION DATE:       05/14/2023    HISTORY AND PHYSICAL EXAMINATION    About 60 minutes were spent coordinating care with Dr. Heladio Gupta, interviewing patient, reviewing EKG tracings, and otherwise admitting patient to the hospital and reviewing CTA of the chest images. CHIEF COMPLAINT:  Nausea, vomiting, and abdominal pain. ADMITTING DIAGNOSIS:  Possible non-ST elevation myocardial infarction versus a metabolic issue. HISTORY OF PRESENT ILLNESS:  This is a very pleasant 16-year-old  American female whom I had just discharged from [facility] who had abdominal pain. She was seen by Dr. Kristy Davis who performed a cardiac catheterization, was treated with stenting and improved. She was evaluated by Gastroenterology when her nausea and vomiting did not improve, and Dr. Beatrice Toussaint suggested that it was related to non-ST elevation MI. She felt much better after her constipation improved and her hypotension, etc., but now she returns 3 days later with continuing issues and deep T-wave inversions that are concerning for myocardial disease or perhaps related to electrolyte imbalance. PAST MEDICAL HISTORY:  Significant for diabetes, hypertension, and thyroid disease and the previously-mentioned issues. MEDICATIONS:  The patient's medications at home are as follows. They include amoxicillin/clavulanic acid for 10 days; aspirin 81 mg daily; Coreg 2.5 mg daily;  Farxiga 10 mg daily; fenofibrate 167 mg daily; gabapentin 300 mg 3 times a day; Glucotrol XL; levothyroxine 200 mg daily; lisinopril 40 mg daily; meclizine 25 mg daily for dizziness and nausea; Protonix 40 mg daily; pioglitazone 45 mg daily; rosuvastatin 40 mg nightly, watch for muscle weakness; Rybelsus 14 mg daily; Carafate 1 g 3 times a day as needed; ticagrelor (Brilinta) 90 mg daily; Detrol LA 4 mg daily; tramadol 50 mg every 6 hours as needed for pain. ALLERGIES:  None. FAMILY HISTORY:  Her father  in [facility], unknown age. Her mother is [de-identified] and living in [facility], but she does not know any of her medical problems. SOCIAL HISTORY:  She still continues to smoke. She does not drink alcohol. REVIEW OF SYSTEMS:  She reports no abdominal pain, no bleeding. She is able to eat and swallow. She is short of breath and has some pain in her chest related to the nausea and vomiting. She has no burning when she urinates. Otherwise, her 13 systems reviewed are negative. PHYSICAL EXAMINATION:    GENERAL:  Well-nourished, well-developed, friendly  American female in some distress. VITAL SIGNS:  Temperature is 97, pulse 76, respiratory rate 17, blood pressure is 201/102, 97%. HEENT:  She is normocephalic, atraumatic. Anicteric. Oropharynx moist.  NECK:  Supple. BACK: no dorsal spine /cva tenderness  LUNGS:  Occasional rhonchi. HEART:  Normal S1, S2. No S3. There is an S4. ABDOMEN:  Soft. Tender in epigastric area. Normal bowel sounds. EXTREMITIES:  Without calf tenderness. Without cyanosis. NEUROLOGIC:  She is alert and oriented x3, friendly and cooperative, and has normal motor strength, 1+ reflexes in the knees and elbows. Toes are downgoing. MUSCULOSKELETAL:  No obvious joint deformities or injuries. SKIN:  She has no obvious rashes or tattoos. PSYCHIATRIC:  She is a little bit anxious, but it is appropriate for the situation. LABORATORY DATA:  White count 7800, hemoglobin 13.2, platelets 921,562. INR 1.03. ALT 31, AST 21, glucose 100, sodium 142, potassium 3.2, chloride 112, CO2 of 20, BUN 16, creatinine 0.91.    ASSESSMENT AND PLAN:    1. Possible recurrent cardiac ischemia. Await Cardiology recommendations. No cardiac catheterization tonight.   We will see how they do, and the patient just recently had an echo, so I will not reorder. Follow trop  2. Obesity, body mass index 34.51. May need GARRET workup. 3.   Abdominal pain, nausea, and vomiting. The patient's CTA of chest and abdomen show only mild stool this time. 4.   Aneurysm ascending aorta is 3.6 x 3.6 cm.  5.   Code status is not currently discussed. Dictated By Sujatha Ngo.  MD Ray  d: 05/14/2023 18:24:30  t: 05/14/2023 18:34:25  Job [id number]  [employer]/    cc: Flavia Augustin MD

## 2023-05-15 NOTE — PLAN OF CARE
All Rodriguez Patient Status:  Inpatient    1955 MRN [id number]   Location [facility] 2W/SW Attending Geremias Bell Tari Day # 0 PCP Kahlil Abdalla MD     Cardiology Nocturnal APN Plan of Care     Page Received: Bedside RN [id number]    Patient third troponin 580      Assessment/Plan:   - Troponin trend is flat, no changes  - Continue heparin gtt    [facility], [address]  2023  11:27 PM

## 2023-05-16 LAB
ALBUMIN SERPL-MCNC: 2.2 G/DL (ref 3.4–5)
ALBUMIN/GLOB SERPL: 0.7 {RATIO} (ref 1–2)
ALP LIVER SERPL-CCNC: 61 U/L
ALT SERPL-CCNC: 18 U/L
ANION GAP SERPL CALC-SCNC: 4 MMOL/L (ref 0–18)
APTT PPP: 27.4 SECONDS (ref 23.3–35.6)
AST SERPL-CCNC: 22 U/L (ref 15–37)
BASOPHILS # BLD AUTO: 0.02 X10(3) UL (ref 0–0.2)
BASOPHILS # BLD AUTO: 0.03 X10(3) UL (ref 0–0.2)
BASOPHILS NFR BLD AUTO: 0.3 %
BASOPHILS NFR BLD AUTO: 0.4 %
BILIRUB SERPL-MCNC: 0.3 MG/DL (ref 0.1–2)
BUN BLD-MCNC: 12 MG/DL (ref 7–18)
BUN/CREAT SERPL: 12.1 (ref 10–20)
CALCIUM BLD-MCNC: 7.6 MG/DL (ref 8.5–10.1)
CHLORIDE SERPL-SCNC: 117 MMOL/L (ref 98–112)
CO2 SERPL-SCNC: 21 MMOL/L (ref 21–32)
CREAT BLD-MCNC: 0.99 MG/DL
DEPRECATED RDW RBC AUTO: 45.7 FL (ref 35.1–46.3)
DEPRECATED RDW RBC AUTO: 48.3 FL (ref 35.1–46.3)
EOSINOPHIL # BLD AUTO: 0.11 X10(3) UL (ref 0–0.7)
EOSINOPHIL # BLD AUTO: 0.16 X10(3) UL (ref 0–0.7)
EOSINOPHIL NFR BLD AUTO: 1.8 %
EOSINOPHIL NFR BLD AUTO: 2.2 %
ERYTHROCYTE [DISTWIDTH] IN BLOOD BY AUTOMATED COUNT: 13.9 % (ref 11–15)
ERYTHROCYTE [DISTWIDTH] IN BLOOD BY AUTOMATED COUNT: 14.4 % (ref 11–15)
GFR SERPLBLD BASED ON 1.73 SQ M-ARVRAT: 63 ML/MIN/1.73M2 (ref 60–?)
GLOBULIN PLAS-MCNC: 3 G/DL (ref 2.8–4.4)
GLUCOSE BLD-MCNC: 91 MG/DL (ref 70–99)
GLUCOSE BLDC GLUCOMTR-MCNC: 103 MG/DL (ref 70–99)
GLUCOSE BLDC GLUCOMTR-MCNC: 127 MG/DL (ref 70–99)
GLUCOSE BLDC GLUCOMTR-MCNC: 168 MG/DL (ref 70–99)
GLUCOSE BLDC GLUCOMTR-MCNC: 200 MG/DL (ref 70–99)
HCT VFR BLD AUTO: 30.3 %
HCT VFR BLD AUTO: 31.8 %
HGB BLD-MCNC: 10.1 G/DL
HGB BLD-MCNC: 9.4 G/DL
IMM GRANULOCYTES # BLD AUTO: 0.03 X10(3) UL (ref 0–1)
IMM GRANULOCYTES # BLD AUTO: 0.03 X10(3) UL (ref 0–1)
IMM GRANULOCYTES NFR BLD: 0.4 %
IMM GRANULOCYTES NFR BLD: 0.5 %
LYMPHOCYTES # BLD AUTO: 1.32 X10(3) UL (ref 1–4)
LYMPHOCYTES # BLD AUTO: 1.39 X10(3) UL (ref 1–4)
LYMPHOCYTES NFR BLD AUTO: 18.7 %
LYMPHOCYTES NFR BLD AUTO: 21.6 %
MAGNESIUM SERPL-MCNC: 2 MG/DL (ref 1.6–2.6)
MCH RBC QN AUTO: 28.3 PG (ref 26–34)
MCH RBC QN AUTO: 28.4 PG (ref 26–34)
MCHC RBC AUTO-ENTMCNC: 31 G/DL (ref 31–37)
MCHC RBC AUTO-ENTMCNC: 31.8 G/DL (ref 31–37)
MCV RBC AUTO: 89.3 FL
MCV RBC AUTO: 91.3 FL
MONOCYTES # BLD AUTO: 0.93 X10(3) UL (ref 0.1–1)
MONOCYTES # BLD AUTO: 0.96 X10(3) UL (ref 0.1–1)
MONOCYTES NFR BLD AUTO: 12.5 %
MONOCYTES NFR BLD AUTO: 15.7 %
NEUTROPHILS # BLD AUTO: 3.66 X10 (3) UL (ref 1.5–7.7)
NEUTROPHILS # BLD AUTO: 3.66 X10(3) UL (ref 1.5–7.7)
NEUTROPHILS # BLD AUTO: 4.88 X10 (3) UL (ref 1.5–7.7)
NEUTROPHILS # BLD AUTO: 4.88 X10(3) UL (ref 1.5–7.7)
NEUTROPHILS NFR BLD AUTO: 60.1 %
NEUTROPHILS NFR BLD AUTO: 65.8 %
OSMOLALITY SERPL CALC.SUM OF ELEC: 293 MOSM/KG (ref 275–295)
PHOSPHATE SERPL-MCNC: 2.6 MG/DL (ref 2.5–4.9)
PLATELET # BLD AUTO: 184 10(3)UL (ref 150–450)
PLATELET # BLD AUTO: 194 10(3)UL (ref 150–450)
POTASSIUM SERPL-SCNC: 2.9 MMOL/L (ref 3.5–5.1)
POTASSIUM SERPL-SCNC: 3 MMOL/L (ref 3.5–5.1)
PROT SERPL-MCNC: 5.2 G/DL (ref 6.4–8.2)
RBC # BLD AUTO: 3.32 X10(6)UL
RBC # BLD AUTO: 3.56 X10(6)UL
SODIUM SERPL-SCNC: 142 MMOL/L (ref 136–145)
WBC # BLD AUTO: 6.1 X10(3) UL (ref 4–11)
WBC # BLD AUTO: 7.4 X10(3) UL (ref 4–11)

## 2023-05-16 PROCEDURE — 99233 SBSQ HOSP IP/OBS HIGH 50: CPT | Performed by: HOSPITALIST

## 2023-05-16 RX ORDER — POTASSIUM CHLORIDE 14.9 MG/ML
20 INJECTION INTRAVENOUS ONCE
Status: DISCONTINUED | OUTPATIENT
Start: 2023-05-16 | End: 2023-05-16

## 2023-05-16 RX ORDER — LISINOPRIL 20 MG/1
20 TABLET ORAL DAILY
Status: DISCONTINUED | OUTPATIENT
Start: 2023-05-17 | End: 2023-05-17

## 2023-05-16 RX ORDER — POTASSIUM CHLORIDE 14.9 MG/ML
20 INJECTION INTRAVENOUS ONCE
Status: COMPLETED | OUTPATIENT
Start: 2023-05-16 | End: 2023-05-16

## 2023-05-16 RX ORDER — ISOSORBIDE MONONITRATE 30 MG/1
30 TABLET, EXTENDED RELEASE ORAL DAILY
Status: DISCONTINUED | OUTPATIENT
Start: 2023-05-16 | End: 2023-05-17

## 2023-05-16 NOTE — PLAN OF CARE
Problem: CARDIOVASCULAR - ADULT  Goal: Maintains optimal cardiac output and hemodynamic stability  Description: INTERVENTIONS:  - Monitor vital signs, rhythm, and trends  - Monitor for bleeding, hypotension and signs of decreased cardiac output  - Evaluate effectiveness of vasoactive medications to optimize hemodynamic stability  - Monitor arterial and/or venous puncture sites for bleeding and/or hematoma  - Assess quality of pulses, skin color and temperature  - Assess for signs of decreased coronary artery perfusion - ex.  Angina  - Evaluate fluid balance, assess for edema, trend weights  Outcome: Progressing  Goal: Absence of cardiac arrhythmias or at baseline  Description: INTERVENTIONS:  - Continuous cardiac monitoring, monitor vital signs, obtain 12 lead EKG if indicated  - Evaluate effectiveness of antiarrhythmic and heart rate control medications as ordered  - Initiate emergency measures for life threatening arrhythmias  - Monitor electrolytes and administer replacement therapy as ordered  Outcome: Progressing     Problem: HEMATOLOGIC - ADULT  Goal: Maintains hematologic stability  Description: INTERVENTIONS  - Assess for signs and symptoms of bleeding or hemorrhage  - Monitor labs and vital signs for trends  - Administer supportive blood products/factors, fluids and medications as ordered and appropriate  - Administer supportive blood products/factors as ordered and appropriate  Outcome: Progressing     Problem: GASTROINTESTINAL - ADULT  Goal: Minimal or absence of nausea and vomiting  Description: INTERVENTIONS:  - Maintain adequate hydration with IV or PO as ordered and tolerated  - Nasogastric tube to low intermittent suction as ordered  - Evaluate effectiveness of ordered antiemetic medications  - Provide nonpharmacologic comfort measures as appropriate  - Advance diet as tolerated, if ordered  - Obtain nutritional consult as needed  - Evaluate fluid balance  Outcome: Not Progressing  Goal: Maintains or returns to baseline bowel function  Description: INTERVENTIONS:  - Assess bowel function  - Maintain adequate hydration with IV or PO as ordered and tolerated  - Evaluate effectiveness of GI medications  - Encourage mobilization and activity  - Obtain nutritional consult as needed  - Establish a toileting routine/schedule  - Consider collaborating with pharmacy to review patient's medication profile  Outcome: Not Progressing  Goal: Maintains adequate nutritional intake (undernourished)  Description: INTERVENTIONS:  - Monitor percentage of each meal consumed  - Identify factors contributing to decreased intake, treat as appropriate  - Assist with meals as needed  - Monitor I&O, WT and lab values  - Obtain nutritional consult as needed  - Optimize oral hygiene and moisture  - Encourage food from home; allow for food preferences  - Enhance eating environment  Outcome: Not Progressing     Problem: PAIN - ADULT  Goal: Verbalizes/displays adequate comfort level or patient's stated pain goal  Description: INTERVENTIONS:  - Encourage pt to monitor pain and request assistance  - Assess pain using appropriate pain scale  - Administer analgesics based on type and severity of pain and evaluate response  - Implement non-pharmacological measures as appropriate and evaluate response  - Consider cultural and social influences on pain and pain management  - Manage/alleviate anxiety  - Utilize distraction and/or relaxation techniques  - Monitor for opioid side effects  - Notify MD/LIP if interventions unsuccessful or patient reports new pain  - Anticipate increased pain with activity and pre-medicate as appropriate  Outcome: Not Progressing     Problem: Diabetes/Glucose Control  Goal: Glucose maintained within prescribed range  Description: INTERVENTIONS:  - Monitor Blood Glucose as ordered  - Assess for signs and symptoms of hyperglycemia and hypoglycemia  - Administer ordered medications to maintain glucose within target range  - Assess barriers to adequate nutritional intake and initiate nutrition consult as needed  - Instruct patient on self management of diabetes  Outcome: Not Progressing

## 2023-05-16 NOTE — PLAN OF CARE
Problem: CARDIOVASCULAR - ADULT  Goal: Maintains optimal cardiac output and hemodynamic stability  Description: INTERVENTIONS:  - Monitor vital signs, rhythm, and trends  - Monitor for bleeding, hypotension and signs of decreased cardiac output  - Evaluate effectiveness of vasoactive medications to optimize hemodynamic stability  - Monitor arterial and/or venous puncture sites for bleeding and/or hematoma  - Assess quality of pulses, skin color and temperature  - Assess for signs of decreased coronary artery perfusion - ex.  Angina  - Evaluate fluid balance, assess for edema, trend weights  Outcome: Progressing  Goal: Absence of cardiac arrhythmias or at baseline  Description: INTERVENTIONS:  - Continuous cardiac monitoring, monitor vital signs, obtain 12 lead EKG if indicated  - Evaluate effectiveness of antiarrhythmic and heart rate control medications as ordered  - Initiate emergency measures for life threatening arrhythmias  - Monitor electrolytes and administer replacement therapy as ordered  Outcome: Progressing     Problem: GASTROINTESTINAL - ADULT  Goal: Minimal or absence of nausea and vomiting  Description: INTERVENTIONS:  - Maintain adequate hydration with IV or PO as ordered and tolerated  - Nasogastric tube to low intermittent suction as ordered  - Evaluate effectiveness of ordered antiemetic medications  - Provide nonpharmacologic comfort measures as appropriate  - Advance diet as tolerated, if ordered  - Obtain nutritional consult as needed  - Evaluate fluid balance  Outcome: Progressing  Goal: Maintains or returns to baseline bowel function  Description: INTERVENTIONS:  - Assess bowel function  - Maintain adequate hydration with IV or PO as ordered and tolerated  - Evaluate effectiveness of GI medications  - Encourage mobilization and activity  - Obtain nutritional consult as needed  - Establish a toileting routine/schedule  - Consider collaborating with pharmacy to review patient's medication profile  Outcome: Not Progressing  Goal: Maintains adequate nutritional intake (undernourished)  Description: INTERVENTIONS:  - Monitor percentage of each meal consumed  - Identify factors contributing to decreased intake, treat as appropriate  - Assist with meals as needed  - Monitor I&O, WT and lab values  - Obtain nutritional consult as needed  - Optimize oral hygiene and moisture  - Encourage food from home; allow for food preferences  - Enhance eating environment  Outcome: Not Progressing     Problem: PAIN - ADULT  Goal: Verbalizes/displays adequate comfort level or patient's stated pain goal  Description: INTERVENTIONS:  - Encourage pt to monitor pain and request assistance  - Assess pain using appropriate pain scale  - Administer analgesics based on type and severity of pain and evaluate response  - Implement non-pharmacological measures as appropriate and evaluate response  - Consider cultural and social influences on pain and pain management  - Manage/alleviate anxiety  - Utilize distraction and/or relaxation techniques  - Monitor for opioid side effects  - Notify MD/LIP if interventions unsuccessful or patient reports new pain  - Anticipate increased pain with activity and pre-medicate as appropriate  Outcome: Not Progressing     Problem: SAFETY ADULT - FALL  Goal: Free from fall injury  Description: INTERVENTIONS:  - Assess pt frequently for physical needs  - Identify cognitive and physical deficits and behaviors that affect risk of falls.   - [place] fall precautions as indicated by assessment.  - Educate pt/family on patient safety including physical limitations  - Instruct pt to call for assistance with activity based on assessment  - Modify environment to reduce risk of injury  - Provide assistive devices as appropriate  - Consider OT/PT consult to assist with strengthening/mobility  - Encourage toileting schedule  Outcome: Progressing     Problem: Diabetes/Glucose Control  Goal: Glucose maintained within prescribed range  Description: INTERVENTIONS:  - Monitor Blood Glucose as ordered  - Assess for signs and symptoms of hyperglycemia and hypoglycemia  - Administer ordered medications to maintain glucose within target range  - Assess barriers to adequate nutritional intake and initiate nutrition consult as needed  - Instruct patient on self management of diabetes  Outcome: Progressing

## 2023-05-17 VITALS
TEMPERATURE: 98 F | SYSTOLIC BLOOD PRESSURE: 111 MMHG | DIASTOLIC BLOOD PRESSURE: 59 MMHG | OXYGEN SATURATION: 98 % | BODY MASS INDEX: 35 KG/M2 | HEART RATE: 72 BPM | WEIGHT: 172.69 LBS | RESPIRATION RATE: 24 BRPM

## 2023-05-17 LAB
ANION GAP SERPL CALC-SCNC: 5 MMOL/L (ref 0–18)
BASOPHILS # BLD AUTO: 0.02 X10(3) UL (ref 0–0.2)
BASOPHILS NFR BLD AUTO: 0.3 %
BUN BLD-MCNC: 13 MG/DL (ref 7–18)
BUN/CREAT SERPL: 10.7 (ref 10–20)
CALCIUM BLD-MCNC: 8.2 MG/DL (ref 8.5–10.1)
CHLORIDE SERPL-SCNC: 118 MMOL/L (ref 98–112)
CO2 SERPL-SCNC: 20 MMOL/L (ref 21–32)
CREAT BLD-MCNC: 1.22 MG/DL
DEPRECATED RDW RBC AUTO: 47.8 FL (ref 35.1–46.3)
EOSINOPHIL # BLD AUTO: 0.22 X10(3) UL (ref 0–0.7)
EOSINOPHIL NFR BLD AUTO: 3.7 %
ERYTHROCYTE [DISTWIDTH] IN BLOOD BY AUTOMATED COUNT: 14.5 % (ref 11–15)
GFR SERPLBLD BASED ON 1.73 SQ M-ARVRAT: 49 ML/MIN/1.73M2 (ref 60–?)
GLUCOSE BLD-MCNC: 97 MG/DL (ref 70–99)
GLUCOSE BLDC GLUCOMTR-MCNC: 117 MG/DL (ref 70–99)
HCT VFR BLD AUTO: 32.8 %
HGB BLD-MCNC: 10.3 G/DL
IMM GRANULOCYTES # BLD AUTO: 0.02 X10(3) UL (ref 0–1)
IMM GRANULOCYTES NFR BLD: 0.3 %
LYMPHOCYTES # BLD AUTO: 1.73 X10(3) UL (ref 1–4)
LYMPHOCYTES NFR BLD AUTO: 29.4 %
MAGNESIUM SERPL-MCNC: 1.9 MG/DL (ref 1.6–2.6)
MCH RBC QN AUTO: 28.3 PG (ref 26–34)
MCHC RBC AUTO-ENTMCNC: 31.4 G/DL (ref 31–37)
MCV RBC AUTO: 90.1 FL
MONOCYTES # BLD AUTO: 0.81 X10(3) UL (ref 0.1–1)
MONOCYTES NFR BLD AUTO: 13.8 %
NEUTROPHILS # BLD AUTO: 3.09 X10 (3) UL (ref 1.5–7.7)
NEUTROPHILS # BLD AUTO: 3.09 X10(3) UL (ref 1.5–7.7)
NEUTROPHILS NFR BLD AUTO: 52.5 %
OSMOLALITY SERPL CALC.SUM OF ELEC: 296 MOSM/KG (ref 275–295)
PHOSPHATE SERPL-MCNC: 2 MG/DL (ref 2.5–4.9)
PLATELET # BLD AUTO: 199 10(3)UL (ref 150–450)
POTASSIUM SERPL-SCNC: 4 MMOL/L (ref 3.5–5.1)
RBC # BLD AUTO: 3.64 X10(6)UL
SODIUM SERPL-SCNC: 143 MMOL/L (ref 136–145)
WBC # BLD AUTO: 5.9 X10(3) UL (ref 4–11)

## 2023-05-17 RX ORDER — LISINOPRIL 20 MG/1
20 TABLET ORAL DAILY
Qty: 30 TABLET | Refills: 3 | Status: SHIPPED | OUTPATIENT
Start: 2023-05-18

## 2023-05-17 RX ORDER — ISOSORBIDE MONONITRATE 30 MG/1
30 TABLET, EXTENDED RELEASE ORAL DAILY
Qty: 30 TABLET | Refills: 3 | Status: SHIPPED | OUTPATIENT
Start: 2023-05-18

## 2023-05-17 NOTE — PLAN OF CARE
Pt discharged home with . Handouts provided detailing medication schedule and follow up appointment schedule, printed in [address] Pt refused language line for discharge instructions. All questions answered. Pt IV's removed x2. Pt removed from tele. Pt brought to vehicle by wheelchair. Problem: Patient Centered Care  Goal: Patient preferences are identified and integrated in the patient's plan of care  Description: Interventions:  - What would you like us to know as we care for you?   - Provide timely, complete, and accurate information to patient/family  - Incorporate patient and family knowledge, values, beliefs, and cultural backgrounds into the planning and delivery of care  - Encourage patient/family to participate in care and decision-making at the level they choose  - Honor patient and family perspectives and choices  Outcome: Adequate for Discharge     Problem: CARDIOVASCULAR - ADULT  Goal: Maintains optimal cardiac output and hemodynamic stability  Description: INTERVENTIONS:  - Monitor vital signs, rhythm, and trends  - Monitor for bleeding, hypotension and signs of decreased cardiac output  - Evaluate effectiveness of vasoactive medications to optimize hemodynamic stability  - Monitor arterial and/or venous puncture sites for bleeding and/or hematoma  - Assess quality of pulses, skin color and temperature  - Assess for signs of decreased coronary artery perfusion - ex.  Angina  - Evaluate fluid balance, assess for edema, trend weights  Outcome: Adequate for Discharge  Goal: Absence of cardiac arrhythmias or at baseline  Description: INTERVENTIONS:  - Continuous cardiac monitoring, monitor vital signs, obtain 12 lead EKG if indicated  - Evaluate effectiveness of antiarrhythmic and heart rate control medications as ordered  - Initiate emergency measures for life threatening arrhythmias  - Monitor electrolytes and administer replacement therapy as ordered  Outcome: Adequate for Discharge     Problem: GASTROINTESTINAL - ADULT  Goal: Minimal or absence of nausea and vomiting  Description: INTERVENTIONS:  - Maintain adequate hydration with IV or PO as ordered and tolerated  - Nasogastric tube to low intermittent suction as ordered  - Evaluate effectiveness of ordered antiemetic medications  - Provide nonpharmacologic comfort measures as appropriate  - Advance diet as tolerated, if ordered  - Obtain nutritional consult as needed  - Evaluate fluid balance  Outcome: Adequate for Discharge  Goal: Maintains or returns to baseline bowel function  Description: INTERVENTIONS:  - Assess bowel function  - Maintain adequate hydration with IV or PO as ordered and tolerated  - Evaluate effectiveness of GI medications  - Encourage mobilization and activity  - Obtain nutritional consult as needed  - Establish a toileting routine/schedule  - Consider collaborating with pharmacy to review patient's medication profile  Outcome: Adequate for Discharge  Goal: Maintains adequate nutritional intake (undernourished)  Description: INTERVENTIONS:  - Monitor percentage of each meal consumed  - Identify factors contributing to decreased intake, treat as appropriate  - Assist with meals as needed  - Monitor I&O, WT and lab values  - Obtain nutritional consult as needed  - Optimize oral hygiene and moisture  - Encourage food from home; allow for food preferences  - Enhance eating environment  Outcome: Adequate for Discharge     Problem: HEMATOLOGIC - ADULT  Goal: Maintains hematologic stability  Description: INTERVENTIONS  - Assess for signs and symptoms of bleeding or hemorrhage  - Monitor labs and vital signs for trends  - Administer supportive blood products/factors, fluids and medications as ordered and appropriate  - Administer supportive blood products/factors as ordered and appropriate  Outcome: Adequate for Discharge  Goal: Free from bleeding injury  Description: (Example usage: patient with low platelets)  INTERVENTIONS:  - Avoid intramuscular injections, enemas and rectal medication administration  - Ensure safe mobilization of patient  - Hold pressure on venipuncture sites to achieve adequate hemostasis  - Assess for signs and symptoms of internal bleeding  - Monitor lab trends  - Patient is to report abnormal signs of bleeding to staff  - Avoid use of toothpicks and dental floss  - Use electric shaver for shaving  - Use soft bristle tooth brush  - Limit straining and forceful nose blowing  Outcome: Adequate for Discharge     Problem: PAIN - ADULT  Goal: Verbalizes/displays adequate comfort level or patient's stated pain goal  Description: INTERVENTIONS:  - Encourage pt to monitor pain and request assistance  - Assess pain using appropriate pain scale  - Administer analgesics based on type and severity of pain and evaluate response  - Implement non-pharmacological measures as appropriate and evaluate response  - Consider cultural and social influences on pain and pain management  - Manage/alleviate anxiety  - Utilize distraction and/or relaxation techniques  - Monitor for opioid side effects  - Notify MD/LIP if interventions unsuccessful or patient reports new pain  - Anticipate increased pain with activity and pre-medicate as appropriate  Outcome: Adequate for Discharge     Problem: SAFETY ADULT - FALL  Goal: Free from fall injury  Description: INTERVENTIONS:  - Assess pt frequently for physical needs  - Identify cognitive and physical deficits and behaviors that affect risk of falls.   - [place] fall precautions as indicated by assessment.  - Educate pt/family on patient safety including physical limitations  - Instruct pt to call for assistance with activity based on assessment  - Modify environment to reduce risk of injury  - Provide assistive devices as appropriate  - Consider OT/PT consult to assist with strengthening/mobility  - Encourage toileting schedule  Outcome: Adequate for Discharge     Problem: Diabetes/Glucose Control  Goal: Glucose maintained within prescribed range  Description: INTERVENTIONS:  - Monitor Blood Glucose as ordered  - Assess for signs and symptoms of hyperglycemia and hypoglycemia  - Administer ordered medications to maintain glucose within target range  - Assess barriers to adequate nutritional intake and initiate nutrition consult as needed  - Instruct patient on self management of diabetes  Outcome: Adequate for Discharge     Problem: Patient/Family Goals  Goal: Patient/Family Long Term Goal  Description: Patient's Long Term Goal:     Interventions:  -   - See additional Care Plan goals for specific interventions  Outcome: Adequate for Discharge  Goal: Patient/Family Short Term Goal  Description: Patient's Short Term Goal:     Interventions:   -  See additional Care Plan goals for specific interventions  Outcome: Adequate for Discharge

## 2023-05-17 NOTE — PLAN OF CARE
Problem: CARDIOVASCULAR - ADULT  Goal: Maintains optimal cardiac output and hemodynamic stability  Description: INTERVENTIONS:  - Monitor vital signs, rhythm, and trends  - Monitor for bleeding, hypotension and signs of decreased cardiac output  - Evaluate effectiveness of vasoactive medications to optimize hemodynamic stability  - Monitor arterial and/or venous puncture sites for bleeding and/or hematoma  - Assess quality of pulses, skin color and temperature  - Assess for signs of decreased coronary artery perfusion - ex.  Angina  - Evaluate fluid balance, assess for edema, trend weights  Outcome: Progressing  Goal: Absence of cardiac arrhythmias or at baseline  Description: INTERVENTIONS:  - Continuous cardiac monitoring, monitor vital signs, obtain 12 lead EKG if indicated  - Evaluate effectiveness of antiarrhythmic and heart rate control medications as ordered  - Initiate emergency measures for life threatening arrhythmias  - Monitor electrolytes and administer replacement therapy as ordered  Outcome: Progressing     Problem: GASTROINTESTINAL - ADULT  Goal: Minimal or absence of nausea and vomiting  Description: INTERVENTIONS:  - Maintain adequate hydration with IV or PO as ordered and tolerated  - Nasogastric tube to low intermittent suction as ordered  - Evaluate effectiveness of ordered antiemetic medications  - Provide nonpharmacologic comfort measures as appropriate  - Advance diet as tolerated, if ordered  - Obtain nutritional consult as needed  - Evaluate fluid balance  Outcome: Progressing  Goal: Maintains or returns to baseline bowel function  Description: INTERVENTIONS:  - Assess bowel function  - Maintain adequate hydration with IV or PO as ordered and tolerated  - Evaluate effectiveness of GI medications  - Encourage mobilization and activity  - Obtain nutritional consult as needed  - Establish a toileting routine/schedule  - Consider collaborating with pharmacy to review patient's medication profile  Outcome: Progressing  Note: Patient has had BM during shift. No new complains of GI symptoms.      Problem: HEMATOLOGIC - ADULT  Goal: Maintains hematologic stability  Description: INTERVENTIONS  - Assess for signs and symptoms of bleeding or hemorrhage  - Monitor labs and vital signs for trends  - Administer supportive blood products/factors, fluids and medications as ordered and appropriate  - Administer supportive blood products/factors as ordered and appropriate  Outcome: Progressing  Goal: Free from bleeding injury  Description: (Example usage: patient with low platelets)  INTERVENTIONS:  - Avoid intramuscular injections, enemas and rectal medication administration  - Ensure safe mobilization of patient  - Hold pressure on venipuncture sites to achieve adequate hemostasis  - Assess for signs and symptoms of internal bleeding  - Monitor lab trends  - Patient is to report abnormal signs of bleeding to staff  - Avoid use of toothpicks and dental floss  - Use electric shaver for shaving  - Use soft bristle tooth brush  - Limit straining and forceful nose blowing  Outcome: Progressing     Problem: PAIN - ADULT  Goal: Verbalizes/displays adequate comfort level or patient's stated pain goal  Description: INTERVENTIONS:  - Encourage pt to monitor pain and request assistance  - Assess pain using appropriate pain scale  - Administer analgesics based on type and severity of pain and evaluate response  - Implement non-pharmacological measures as appropriate and evaluate response  - Consider cultural and social influences on pain and pain management  - Manage/alleviate anxiety  - Utilize distraction and/or relaxation techniques  - Monitor for opioid side effects  - Notify MD/LIP if interventions unsuccessful or patient reports new pain  - Anticipate increased pain with activity and pre-medicate as appropriate  Outcome: Progressing     Problem: SAFETY ADULT - FALL  Goal: Free from fall injury  Description: INTERVENTIONS:  - Assess pt frequently for physical needs  - Identify cognitive and physical deficits and behaviors that affect risk of falls. - [place] fall precautions as indicated by assessment.  - Educate pt/family on patient safety including physical limitations  - Instruct pt to call for assistance with activity based on assessment  - Modify environment to reduce risk of injury  - Provide assistive devices as appropriate  - Consider OT/PT consult to assist with strengthening/mobility  - Encourage toileting schedule  Outcome: Progressing  Note: Patient has had to be reinforced on use of call light to get up to the bathroom. Patient is independent, but still tethered to EKG monitoring.      Problem: Diabetes/Glucose Control  Goal: Glucose maintained within prescribed range  Description: INTERVENTIONS:  - Monitor Blood Glucose as ordered  - Assess for signs and symptoms of hyperglycemia and hypoglycemia  - Administer ordered medications to maintain glucose within target range  - Assess barriers to adequate nutritional intake and initiate nutrition consult as needed  - Instruct patient on self management of diabetes  Outcome: Progressing

## 2023-05-19 NOTE — PAYOR COMM NOTE
--------------  DISCHARGE REVIEW    Payor: [employer]  Subscriber #:  [id number]  Authorization Number: [id number]    Admit date: 5/14/23  Admit time:   9:13 PM  Discharge Date: 5/17/2023 12:50 PM     Admitting Physician: Tanya Man MD  Attending Physician:  No att. providers found  Primary Care Physician: Dannie Nguyen MD

## 2023-06-07 ENCOUNTER — HOSPITAL ENCOUNTER (EMERGENCY)
Facility: HOSPITAL | Age: 68
Discharge: HOME | End: 2023-06-07
Attending: EMERGENCY MEDICINE
Payer: MEDICARE

## 2023-06-07 ENCOUNTER — APPOINTMENT (OUTPATIENT)
Dept: CT IMAGING | Facility: HOSPITAL | Age: 68
End: 2023-06-07
Attending: EMERGENCY MEDICINE
Payer: MEDICARE

## 2023-06-07 VITALS
RESPIRATION RATE: 24 BRPM | OXYGEN SATURATION: 97 % | BODY MASS INDEX: 33.04 KG/M2 | HEIGHT: 61 IN | HEART RATE: 89 BPM | SYSTOLIC BLOOD PRESSURE: 110 MMHG | TEMPERATURE: 98 F | WEIGHT: 175 LBS | DIASTOLIC BLOOD PRESSURE: 60 MMHG

## 2023-06-07 DIAGNOSIS — S01.01XA LACERATION OF SCALP, INITIAL ENCOUNTER: ICD-10-CM

## 2023-06-07 DIAGNOSIS — E16.2 HYPOGLYCEMIA: ICD-10-CM

## 2023-06-07 DIAGNOSIS — S09.8XXA BLUNT HEAD TRAUMA, INITIAL ENCOUNTER: Primary | ICD-10-CM

## 2023-06-07 LAB
ANION GAP SERPL CALC-SCNC: 11 MMOL/L (ref 0–18)
BASOPHILS # BLD AUTO: 0.01 X10(3) UL (ref 0–0.2)
BASOPHILS NFR BLD AUTO: 0.2 %
BUN BLD-MCNC: 20 MG/DL (ref 7–18)
BUN/CREAT SERPL: 17.7 (ref 10–20)
CALCIUM BLD-MCNC: 9.1 MG/DL (ref 8.5–10.1)
CHLORIDE SERPL-SCNC: 111 MMOL/L (ref 98–112)
CO2 SERPL-SCNC: 19 MMOL/L (ref 21–32)
CREAT BLD-MCNC: 1.13 MG/DL
DEPRECATED RDW RBC AUTO: 48.2 FL (ref 35.1–46.3)
EOSINOPHIL # BLD AUTO: 0 X10(3) UL (ref 0–0.7)
EOSINOPHIL NFR BLD AUTO: 0 %
ERYTHROCYTE [DISTWIDTH] IN BLOOD BY AUTOMATED COUNT: 14.6 % (ref 11–15)
GFR SERPLBLD BASED ON 1.73 SQ M-ARVRAT: 54 ML/MIN/1.73M2 (ref 60–?)
GLUCOSE BLD-MCNC: 65 MG/DL (ref 70–99)
GLUCOSE BLDC GLUCOMTR-MCNC: 66 MG/DL (ref 70–99)
GLUCOSE BLDC GLUCOMTR-MCNC: 83 MG/DL (ref 70–99)
HCT VFR BLD AUTO: 37.4 %
HGB BLD-MCNC: 11.9 G/DL
IMM GRANULOCYTES # BLD AUTO: 0.03 X10(3) UL (ref 0–1)
IMM GRANULOCYTES NFR BLD: 0.7 %
LYMPHOCYTES # BLD AUTO: 0.43 X10(3) UL (ref 1–4)
LYMPHOCYTES NFR BLD AUTO: 9.9 %
MCH RBC QN AUTO: 28.7 PG (ref 26–34)
MCHC RBC AUTO-ENTMCNC: 31.8 G/DL (ref 31–37)
MCV RBC AUTO: 90.3 FL
MONOCYTES # BLD AUTO: 0.46 X10(3) UL (ref 0.1–1)
MONOCYTES NFR BLD AUTO: 10.6 %
NEUTROPHILS # BLD AUTO: 3.43 X10 (3) UL (ref 1.5–7.7)
NEUTROPHILS # BLD AUTO: 3.43 X10(3) UL (ref 1.5–7.7)
NEUTROPHILS NFR BLD AUTO: 78.6 %
OSMOLALITY SERPL CALC.SUM OF ELEC: 293 MOSM/KG (ref 275–295)
PLATELET # BLD AUTO: 177 10(3)UL (ref 150–450)
POTASSIUM SERPL-SCNC: 3 MMOL/L (ref 3.5–5.1)
RBC # BLD AUTO: 4.14 X10(6)UL
SODIUM SERPL-SCNC: 141 MMOL/L (ref 136–145)
WBC # BLD AUTO: 4.4 X10(3) UL (ref 4–11)

## 2023-06-07 PROCEDURE — 36415 COLL VENOUS BLD VENIPUNCTURE: CPT

## 2023-06-07 PROCEDURE — 85025 COMPLETE CBC W/AUTO DIFF WBC: CPT | Performed by: EMERGENCY MEDICINE

## 2023-06-07 PROCEDURE — 80048 BASIC METABOLIC PNL TOTAL CA: CPT | Performed by: EMERGENCY MEDICINE

## 2023-06-07 PROCEDURE — 82962 GLUCOSE BLOOD TEST: CPT

## 2023-06-07 PROCEDURE — 99285 EMERGENCY DEPT VISIT HI MDM: CPT

## 2023-06-07 PROCEDURE — 12002 RPR S/N/AX/GEN/TRNK2.6-7.5CM: CPT

## 2023-06-07 PROCEDURE — 99284 EMERGENCY DEPT VISIT MOD MDM: CPT

## 2023-06-07 PROCEDURE — 70450 CT HEAD/BRAIN W/O DYE: CPT | Performed by: EMERGENCY MEDICINE

## 2023-06-07 RX ORDER — NICOTINE POLACRILEX 4 MG
15 LOZENGE BUCCAL ONCE
Status: COMPLETED | OUTPATIENT
Start: 2023-06-07 | End: 2023-06-07

## 2023-06-07 RX ORDER — ACETAMINOPHEN 325 MG/1
650 TABLET ORAL EVERY 6 HOURS PRN
Qty: 30 TABLET | Refills: 0 | Status: SHIPPED | OUTPATIENT
Start: 2023-06-07

## 2023-06-07 RX ORDER — NICOTINE POLACRILEX 4 MG
LOZENGE BUCCAL
Status: COMPLETED
Start: 2023-06-07 | End: 2023-06-07

## 2023-06-07 RX ORDER — LIDOCAINE HYDROCHLORIDE AND EPINEPHRINE 20; 5 MG/ML; UG/ML
20 INJECTION, SOLUTION EPIDURAL; INFILTRATION; INTRACAUDAL; PERINEURAL ONCE
Status: COMPLETED | OUTPATIENT
Start: 2023-06-07 | End: 2023-06-07

## 2023-06-07 NOTE — ED INITIAL ASSESSMENT (HPI)
Pt from home, EMS reports she was standing by railing and attempted to walk away when she fell and hit her head on the railing. Denies LOC, pt on blood thinner. BG 58.     Pt Croatian speaking

## 2023-06-08 ENCOUNTER — HOSPITAL ENCOUNTER (OUTPATIENT)
Facility: HOSPITAL | Age: 68
Setting detail: OBSERVATION
Discharge: HOME | End: 2023-06-10
Attending: EMERGENCY MEDICINE | Admitting: HOSPITALIST
Payer: MEDICARE

## 2023-06-08 DIAGNOSIS — R55 SYNCOPE AND COLLAPSE: Primary | ICD-10-CM

## 2023-06-08 DIAGNOSIS — E16.2 HYPOGLYCEMIA: ICD-10-CM

## 2023-06-08 DIAGNOSIS — E87.6 HYPOKALEMIA: ICD-10-CM

## 2023-06-08 LAB
ANION GAP SERPL CALC-SCNC: 9 MMOL/L (ref 0–18)
ATRIAL RATE: 87 BPM
BASOPHILS # BLD AUTO: 0.01 X10(3) UL (ref 0–0.2)
BASOPHILS NFR BLD AUTO: 0.2 %
BUN BLD-MCNC: 19 MG/DL (ref 7–18)
BUN/CREAT SERPL: 15.3 (ref 10–20)
CALCIUM BLD-MCNC: 9.1 MG/DL (ref 8.5–10.1)
CHLORIDE SERPL-SCNC: 111 MMOL/L (ref 98–112)
CHOLEST SERPL-MCNC: 70 MG/DL (ref ?–200)
CO2 SERPL-SCNC: 23 MMOL/L (ref 21–32)
CREAT BLD-MCNC: 1.24 MG/DL
DEPRECATED RDW RBC AUTO: 48 FL (ref 35.1–46.3)
EOSINOPHIL # BLD AUTO: 0.03 X10(3) UL (ref 0–0.7)
EOSINOPHIL NFR BLD AUTO: 0.5 %
ERYTHROCYTE [DISTWIDTH] IN BLOOD BY AUTOMATED COUNT: 14.6 % (ref 11–15)
GFR SERPLBLD BASED ON 1.73 SQ M-ARVRAT: 48 ML/MIN/1.73M2 (ref 60–?)
GLUCOSE BLD-MCNC: 42 MG/DL (ref 70–99)
GLUCOSE BLDC GLUCOMTR-MCNC: 107 MG/DL (ref 70–99)
GLUCOSE BLDC GLUCOMTR-MCNC: 115 MG/DL (ref 70–99)
GLUCOSE BLDC GLUCOMTR-MCNC: 140 MG/DL (ref 70–99)
GLUCOSE BLDC GLUCOMTR-MCNC: 156 MG/DL (ref 70–99)
GLUCOSE BLDC GLUCOMTR-MCNC: 43 MG/DL (ref 70–99)
GLUCOSE BLDC GLUCOMTR-MCNC: 44 MG/DL (ref 70–99)
GLUCOSE BLDC GLUCOMTR-MCNC: 45 MG/DL (ref 70–99)
GLUCOSE BLDC GLUCOMTR-MCNC: 49 MG/DL (ref 70–99)
GLUCOSE BLDC GLUCOMTR-MCNC: 63 MG/DL (ref 70–99)
GLUCOSE BLDC GLUCOMTR-MCNC: 75 MG/DL (ref 70–99)
GLUCOSE BLDC GLUCOMTR-MCNC: 80 MG/DL (ref 70–99)
GLUCOSE BLDC GLUCOMTR-MCNC: 80 MG/DL (ref 70–99)
HCT VFR BLD AUTO: 37.2 %
HDLC SERPL-MCNC: 33 MG/DL (ref 40–59)
HGB BLD-MCNC: 11.8 G/DL
IMM GRANULOCYTES # BLD AUTO: 0.04 X10(3) UL (ref 0–1)
IMM GRANULOCYTES NFR BLD: 0.6 %
LDLC SERPL CALC-MCNC: 14 MG/DL (ref ?–100)
LYMPHOCYTES # BLD AUTO: 1.04 X10(3) UL (ref 1–4)
LYMPHOCYTES NFR BLD AUTO: 15.9 %
MCH RBC QN AUTO: 28.2 PG (ref 26–34)
MCHC RBC AUTO-ENTMCNC: 31.7 G/DL (ref 31–37)
MCV RBC AUTO: 89 FL
MONOCYTES # BLD AUTO: 1.1 X10(3) UL (ref 0.1–1)
MONOCYTES NFR BLD AUTO: 16.8 %
NEUTROPHILS # BLD AUTO: 4.31 X10 (3) UL (ref 1.5–7.7)
NEUTROPHILS # BLD AUTO: 4.31 X10(3) UL (ref 1.5–7.7)
NEUTROPHILS NFR BLD AUTO: 66 %
NONHDLC SERPL-MCNC: 37 MG/DL (ref ?–130)
OSMOLALITY SERPL CALC.SUM OF ELEC: 295 MOSM/KG (ref 275–295)
P AXIS: 30 DEGREES
P-R INTERVAL: 156 MS
PLATELET # BLD AUTO: 198 10(3)UL (ref 150–450)
POTASSIUM SERPL-SCNC: 2.8 MMOL/L (ref 3.5–5.1)
POTASSIUM SERPL-SCNC: 3.5 MMOL/L (ref 3.5–5.1)
Q-T INTERVAL: 418 MS
QRS DURATION: 96 MS
QTC CALCULATION (BEZET): 502 MS
R AXIS: -5 DEGREES
RBC # BLD AUTO: 4.18 X10(6)UL
SODIUM SERPL-SCNC: 143 MMOL/L (ref 136–145)
T AXIS: 152 DEGREES
TRIGL SERPL-MCNC: 129 MG/DL (ref 30–149)
TROPONIN I HIGH SENSITIVITY: 279 NG/L
TROPONIN I HIGH SENSITIVITY: 291 NG/L
VENTRICULAR RATE: 87 BPM
VLDLC SERPL CALC-MCNC: 16 MG/DL (ref 0–30)
WBC # BLD AUTO: 6.5 X10(3) UL (ref 4–11)

## 2023-06-08 PROCEDURE — 99223 1ST HOSP IP/OBS HIGH 75: CPT | Performed by: HOSPITALIST

## 2023-06-08 RX ORDER — DEXTROSE MONOHYDRATE 25 G/50ML
50 INJECTION, SOLUTION INTRAVENOUS
Status: DISCONTINUED | OUTPATIENT
Start: 2023-06-08 | End: 2023-06-10

## 2023-06-08 RX ORDER — DEXTROSE AND SODIUM CHLORIDE 5; .45 G/100ML; G/100ML
INJECTION, SOLUTION INTRAVENOUS CONTINUOUS
Status: DISCONTINUED | OUTPATIENT
Start: 2023-06-08 | End: 2023-06-10

## 2023-06-08 RX ORDER — TRAMADOL HYDROCHLORIDE 50 MG/1
50 TABLET ORAL EVERY 6 HOURS PRN
Status: DISCONTINUED | OUTPATIENT
Start: 2023-06-08 | End: 2023-06-10

## 2023-06-08 RX ORDER — MECLIZINE HCL 12.5 MG/1
25 TABLET ORAL 3 TIMES DAILY PRN
Status: DISCONTINUED | OUTPATIENT
Start: 2023-06-08 | End: 2023-06-10

## 2023-06-08 RX ORDER — POTASSIUM CHLORIDE 20 MEQ/1
40 TABLET, EXTENDED RELEASE ORAL EVERY 4 HOURS
Status: COMPLETED | OUTPATIENT
Start: 2023-06-08 | End: 2023-06-08

## 2023-06-08 RX ORDER — LISINOPRIL 20 MG/1
20 TABLET ORAL DAILY
Status: DISCONTINUED | OUTPATIENT
Start: 2023-06-08 | End: 2023-06-10

## 2023-06-08 RX ORDER — NICOTINE POLACRILEX 4 MG
30 LOZENGE BUCCAL
Status: DISCONTINUED | OUTPATIENT
Start: 2023-06-08 | End: 2023-06-10

## 2023-06-08 RX ORDER — OXYBUTYNIN CHLORIDE 10 MG/1
10 TABLET, EXTENDED RELEASE ORAL DAILY
Status: DISCONTINUED | OUTPATIENT
Start: 2023-06-08 | End: 2023-06-10

## 2023-06-08 RX ORDER — LEVOTHYROXINE SODIUM 0.1 MG/1
200 TABLET ORAL
Status: DISCONTINUED | OUTPATIENT
Start: 2023-06-08 | End: 2023-06-10

## 2023-06-08 RX ORDER — NICOTINE POLACRILEX 4 MG
15 LOZENGE BUCCAL
Status: DISCONTINUED | OUTPATIENT
Start: 2023-06-08 | End: 2023-06-10

## 2023-06-08 RX ORDER — ROSUVASTATIN CALCIUM 20 MG/1
40 TABLET, COATED ORAL NIGHTLY
Status: DISCONTINUED | OUTPATIENT
Start: 2023-06-08 | End: 2023-06-10

## 2023-06-08 RX ORDER — FENOFIBRATE 67 MG/1
67 CAPSULE ORAL
Status: DISCONTINUED | OUTPATIENT
Start: 2023-06-08 | End: 2023-06-10

## 2023-06-08 RX ORDER — GABAPENTIN 300 MG/1
300 CAPSULE ORAL 3 TIMES DAILY
Status: DISCONTINUED | OUTPATIENT
Start: 2023-06-08 | End: 2023-06-10

## 2023-06-08 RX ORDER — DEXTROSE MONOHYDRATE 25 G/50ML
INJECTION, SOLUTION INTRAVENOUS
Status: COMPLETED
Start: 2023-06-08 | End: 2023-06-08

## 2023-06-08 RX ORDER — DEXTROSE MONOHYDRATE 25 G/50ML
50 INJECTION, SOLUTION INTRAVENOUS ONCE
Status: COMPLETED | OUTPATIENT
Start: 2023-06-08 | End: 2023-06-08

## 2023-06-08 RX ORDER — POTASSIUM CHLORIDE 20 MEQ/1
40 TABLET, EXTENDED RELEASE ORAL EVERY 4 HOURS
Status: DISPENSED | OUTPATIENT
Start: 2023-06-08 | End: 2023-06-09

## 2023-06-08 RX ORDER — ISOSORBIDE MONONITRATE 30 MG/1
30 TABLET, EXTENDED RELEASE ORAL DAILY
Status: DISCONTINUED | OUTPATIENT
Start: 2023-06-08 | End: 2023-06-10

## 2023-06-08 RX ORDER — PANTOPRAZOLE SODIUM 40 MG/1
40 TABLET, DELAYED RELEASE ORAL
Status: DISCONTINUED | OUTPATIENT
Start: 2023-06-08 | End: 2023-06-10

## 2023-06-08 RX ORDER — ASPIRIN 81 MG/1
81 TABLET ORAL DAILY
Status: DISCONTINUED | OUTPATIENT
Start: 2023-06-08 | End: 2023-06-10

## 2023-06-08 NOTE — ED INITIAL ASSESSMENT (HPI)
Patient accompanied by family for low BG that was 52 around 10 pm, gave soda and snack. Family brought her in because BG was not coming back up. BG 49.

## 2023-06-08 NOTE — ED QUICK NOTES
Orders for admission, patient is aware of plan and ready to go upstairs. Any questions, please call ED RN johnny at extension [phone].      Patient Covid vaccination status: Fully vaccinated     COVID Test Ordered in ED: None    COVID Suspicion at Admission: N/A    Running Infusions:      Mental Status/LOC at time of transport: a&ox3    Other pertinent information: fall risk  CIWA score: N/A   NIH score:  N/A

## 2023-06-08 NOTE — ED QUICK NOTES
Carly's POC glucose was checked prior to going to inpatient bed. It was 45. Hypoglycemia protocol initiated.

## 2023-06-08 NOTE — PLAN OF CARE
Patient arrived from ER. Blood sugar rechecked. Patient asymptomatic. Medications given per MAR. Will continue to  monitor and follow plan of care. Problem: Patient Centered Care  Goal: Patient preferences are identified and integrated in the patient's plan of care  Description: Interventions:  - What would you like us to know as we care for you?  Why is this happening  - Provide timely, complete, and accurate information to patient/family  - Incorporate patient and family knowledge, values, beliefs, and cultural backgrounds into the planning and delivery of care  - Encourage patient/family to participate in care and decision-making at the level they choose  - Honor patient and family perspectives and choices  Outcome: Progressing     Problem: Diabetes/Glucose Control  Goal: Glucose maintained within prescribed range  Description: INTERVENTIONS:  - Monitor Blood Glucose as ordered  - Assess for signs and symptoms of hyperglycemia and hypoglycemia  - Administer ordered medications to maintain glucose within target range  - Assess barriers to adequate nutritional intake and initiate nutrition consult as needed  - Instruct patient on self management of diabetes  Outcome: Not Progressing     Problem: Patient/Family Goals  Goal: Patient/Family Long Term Goal  Description: Patient's Long Term Goal: to be well    Interventions:    - See additional Care Plan goals for specific interventions  Outcome: Progressing  Goal: Patient/Family Short Term Goal  Description: Patient's Short Term Goal: for blood sugar to be normal    Interventions:     - See additional Care Plan goals for specific interventions  Outcome: Progressing

## 2023-06-09 LAB
ANION GAP SERPL CALC-SCNC: 2 MMOL/L (ref 0–18)
BUN BLD-MCNC: 8 MG/DL (ref 7–18)
BUN/CREAT SERPL: 8.2 (ref 10–20)
CALCIUM BLD-MCNC: 8.9 MG/DL (ref 8.5–10.1)
CHLORIDE SERPL-SCNC: 116 MMOL/L (ref 98–112)
CO2 SERPL-SCNC: 25 MMOL/L (ref 21–32)
CREAT BLD-MCNC: 0.97 MG/DL
GFR SERPLBLD BASED ON 1.73 SQ M-ARVRAT: 65 ML/MIN/1.73M2 (ref 60–?)
GLUCOSE BLD-MCNC: 134 MG/DL (ref 70–99)
GLUCOSE BLDC GLUCOMTR-MCNC: 124 MG/DL (ref 70–99)
GLUCOSE BLDC GLUCOMTR-MCNC: 127 MG/DL (ref 70–99)
GLUCOSE BLDC GLUCOMTR-MCNC: 162 MG/DL (ref 70–99)
GLUCOSE BLDC GLUCOMTR-MCNC: 81 MG/DL (ref 70–99)
MAGNESIUM SERPL-MCNC: 1.8 MG/DL (ref 1.6–2.6)
OSMOLALITY SERPL CALC.SUM OF ELEC: 296 MOSM/KG (ref 275–295)
POTASSIUM SERPL-SCNC: 4.3 MMOL/L (ref 3.5–5.1)
POTASSIUM SERPL-SCNC: 5.2 MMOL/L (ref 3.5–5.1)
SODIUM SERPL-SCNC: 143 MMOL/L (ref 136–145)

## 2023-06-09 PROCEDURE — 99233 SBSQ HOSP IP/OBS HIGH 50: CPT | Performed by: HOSPITALIST

## 2023-06-09 RX ORDER — MAGNESIUM OXIDE 400 MG/1
400 TABLET ORAL ONCE
Status: COMPLETED | OUTPATIENT
Start: 2023-06-09 | End: 2023-06-09

## 2023-06-09 NOTE — PLAN OF CARE
Ao4, up with assistance. EF of 45%. K covered per protocol. Endocrine consulted. Troponin downtrending. IV fluids. MD adjusting medications. Problem: NEUROLOGICAL - ADULT  Goal: Achieves stable or improved neurological status  Description: INTERVENTIONS  - Assess for and report changes in neurological status  - Initiate measures to prevent increased intracranial pressure  - Maintain blood pressure and fluid volume within ordered parameters to optimize cerebral perfusion and minimize risk of hemorrhage  - Monitor temperature, glucose, and sodium.  Initiate appropriate interventions as ordered  Outcome: Not Progressing  Goal: Absence of seizures  Description: INTERVENTIONS  - Monitor for seizure activity  - Administer anti-seizure medications as ordered  - Monitor neurological status  Outcome: Not Progressing  Goal: Remains free of injury related to seizure activity  Description: INTERVENTIONS:  - Maintain airway, patient safety  and administer oxygen as ordered  - Monitor patient for seizure activity, document and report duration and description of seizure to MD/LIP  - If seizure occurs, turn patient to side and suction secretions as needed  - Reorient patient post seizure  - Seizure pads on all 4 side rails  - Instruct patient/family to notify RN of any seizure activity  - Instruct patient/family to call for assistance with activity based on assessment  Outcome: Not Progressing  Goal: Achieves maximal functionality and self care  Description: INTERVENTIONS  - Monitor swallowing and airway patency with patient fatigue and changes in neurological status  - Encourage and assist patient to increase activity and self care with guidance from PT/OT  - Encourage visually impaired, hearing impaired and aphasic patients to use assistive/communication devices  Outcome: Not Progressing

## 2023-06-09 NOTE — PLAN OF CARE
Patient is alert, ambulating to bathroom without complaints of dizziness or weakness. No complaints of pain or discomforts. Continuing with IVF, per yocasta, to page MD dinner blood sugar, trying to wean off IVF. Possible discharge tomorrow if blood sugars stable. Problem: Diabetes/Glucose Control  Goal: Glucose maintained within prescribed range  Description: INTERVENTIONS:  - Monitor Blood Glucose as ordered  - Assess for signs and symptoms of hyperglycemia and hypoglycemia  - Administer ordered medications to maintain glucose within target range  - Assess barriers to adequate nutritional intake and initiate nutrition consult as needed  - Instruct patient on self management of diabetes  Outcome: Progressing     Problem: NEUROLOGICAL - ADULT  Goal: Achieves stable or improved neurological status  Description: INTERVENTIONS  - Assess for and report changes in neurological status  - Initiate measures to prevent increased intracranial pressure  - Maintain blood pressure and fluid volume within ordered parameters to optimize cerebral perfusion and minimize risk of hemorrhage  - Monitor temperature, glucose, and sodium.  Initiate appropriate interventions as ordered  Outcome: Progressing  Goal: Achieves maximal functionality and self care  Description: INTERVENTIONS  - Monitor swallowing and airway patency with patient fatigue and changes in neurological status  - Encourage and assist patient to increase activity and self care with guidance from PT/OT  - Encourage visually impaired, hearing impaired and aphasic patients to use assistive/communication devices  Outcome: Progressing

## 2023-06-10 VITALS
TEMPERATURE: 98 F | SYSTOLIC BLOOD PRESSURE: 148 MMHG | OXYGEN SATURATION: 95 % | WEIGHT: 176.19 LBS | HEART RATE: 80 BPM | DIASTOLIC BLOOD PRESSURE: 70 MMHG | RESPIRATION RATE: 16 BRPM | BODY MASS INDEX: 33 KG/M2

## 2023-06-10 LAB
ANION GAP SERPL CALC-SCNC: 4 MMOL/L (ref 0–18)
BUN BLD-MCNC: 9 MG/DL (ref 7–18)
BUN/CREAT SERPL: 9.5 (ref 10–20)
CALCIUM BLD-MCNC: 8.8 MG/DL (ref 8.5–10.1)
CHLORIDE SERPL-SCNC: 115 MMOL/L (ref 98–112)
CO2 SERPL-SCNC: 25 MMOL/L (ref 21–32)
CREAT BLD-MCNC: 0.95 MG/DL
GFR SERPLBLD BASED ON 1.73 SQ M-ARVRAT: 66 ML/MIN/1.73M2 (ref 60–?)
GLUCOSE BLD-MCNC: 84 MG/DL (ref 70–99)
GLUCOSE BLDC GLUCOMTR-MCNC: 108 MG/DL (ref 70–99)
GLUCOSE BLDC GLUCOMTR-MCNC: 158 MG/DL (ref 70–99)
GLUCOSE BLDC GLUCOMTR-MCNC: 88 MG/DL (ref 70–99)
MAGNESIUM SERPL-MCNC: 1.8 MG/DL (ref 1.6–2.6)
OSMOLALITY SERPL CALC.SUM OF ELEC: 296 MOSM/KG (ref 275–295)
POTASSIUM SERPL-SCNC: 3.9 MMOL/L (ref 3.5–5.1)
SODIUM SERPL-SCNC: 144 MMOL/L (ref 136–145)

## 2023-06-10 PROCEDURE — 99239 HOSP IP/OBS DSCHRG MGMT >30: CPT | Performed by: HOSPITALIST

## 2023-06-10 RX ORDER — FENOFIBRATE 67 MG/1
67 CAPSULE ORAL
Qty: 30 CAPSULE | Refills: 0 | Status: SHIPPED | OUTPATIENT
Start: 2023-06-10 | End: 2023-07-10

## 2023-06-10 RX ORDER — MAGNESIUM OXIDE 400 MG/1
400 TABLET ORAL ONCE
Status: COMPLETED | OUTPATIENT
Start: 2023-06-10 | End: 2023-06-10

## 2023-06-10 NOTE — PLAN OF CARE
Pt and  given discharge instructions. Explained checking blood sugars at home and what to do if blood sugar low. IV and Tele removed. Pt ambulated to [facility] with  for discharge. Problem: Patient Centered Care  Goal: Patient preferences are identified and integrated in the patient's plan of care  Description: Interventions:  - What would you like us to know as we care for you?  Why is this happening  - Provide timely, complete, and accurate information to patient/family  - Incorporate patient and family knowledge, values, beliefs, and cultural backgrounds into the planning and delivery of care  - Encourage patient/family to participate in care and decision-making at the level they choose  - Honor patient and family perspectives and choices  Outcome: Completed     Problem: Diabetes/Glucose Control  Goal: Glucose maintained within prescribed range  Description: INTERVENTIONS:  - Monitor Blood Glucose as ordered  - Assess for signs and symptoms of hyperglycemia and hypoglycemia  - Administer ordered medications to maintain glucose within target range  - Assess barriers to adequate nutritional intake and initiate nutrition consult as needed  - Instruct patient on self management of diabetes  Outcome: Completed     Problem: Patient/Family Goals  Goal: Patient/Family Long Term Goal  Description: Patient's Long Term Goal: to be well    Interventions:    - See additional Care Plan goals for specific interventions  Outcome: Completed  Goal: Patient/Family Short Term Goal  Description: Patient's Short Term Goal: for blood sugar to be normal    Interventions:     - See additional Care Plan goals for specific interventions  Outcome: Completed     Problem: NEUROLOGICAL - ADULT  Goal: Achieves stable or improved neurological status  Description: INTERVENTIONS  - Assess for and report changes in neurological status  - Initiate measures to prevent increased intracranial pressure  - Maintain blood pressure and fluid volume within ordered parameters to optimize cerebral perfusion and minimize risk of hemorrhage  - Monitor temperature, glucose, and sodium.  Initiate appropriate interventions as ordered  Outcome: Completed  Goal: Absence of seizures  Description: INTERVENTIONS  - Monitor for seizure activity  - Administer anti-seizure medications as ordered  - Monitor neurological status  Outcome: Completed  Goal: Remains free of injury related to seizure activity  Description: INTERVENTIONS:  - Maintain airway, patient safety  and administer oxygen as ordered  - Monitor patient for seizure activity, document and report duration and description of seizure to MD/LIP  - If seizure occurs, turn patient to side and suction secretions as needed  - Reorient patient post seizure  - Seizure pads on all 4 side rails  - Instruct patient/family to notify RN of any seizure activity  - Instruct patient/family to call for assistance with activity based on assessment  Outcome: Completed  Goal: Achieves maximal functionality and self care  Description: INTERVENTIONS  - Monitor swallowing and airway patency with patient fatigue and changes in neurological status  - Encourage and assist patient to increase activity and self care with guidance from PT/OT  - Encourage visually impaired, hearing impaired and aphasic patients to use assistive/communication devices  Outcome: Completed

## 2023-06-10 NOTE — CM/SW NOTE
06/10/23 1100   Discharge disposition   Expected discharge disposition Home or Self   Discharge transportation Private car     Luis DEE RN Vicky Amin RN Case Manager  [phone]

## 2023-06-10 NOTE — PLAN OF CARE
Patient alert x4, RA. No acute events throughout the night. Ivf running until AM blood glucose drawn. Educated patient to call for assistance, call light within reach. Problem: Patient Centered Care  Goal: Patient preferences are identified and integrated in the patient's plan of care  Description: Interventions:  - What would you like us to know as we care for you?  Why is this happening  - Provide timely, complete, and accurate information to patient/family  - Incorporate patient and family knowledge, values, beliefs, and cultural backgrounds into the planning and delivery of care  - Encourage patient/family to participate in care and decision-making at the level they choose  - Honor patient and family perspectives and choices  Outcome: Progressing     Problem: Diabetes/Glucose Control  Goal: Glucose maintained within prescribed range  Description: INTERVENTIONS:  - Monitor Blood Glucose as ordered  - Assess for signs and symptoms of hyperglycemia and hypoglycemia  - Administer ordered medications to maintain glucose within target range  - Assess barriers to adequate nutritional intake and initiate nutrition consult as needed  - Instruct patient on self management of diabetes  Outcome: Progressing     Problem: Patient/Family Goals  Goal: Patient/Family Long Term Goal  Description: Patient's Long Term Goal: to be well    Interventions:    - See additional Care Plan goals for specific interventions  Outcome: Progressing  Goal: Patient/Family Short Term Goal  Description: Patient's Short Term Goal: for blood sugar to be normal    Interventions:     - See additional Care Plan goals for specific interventions  Outcome: Progressing     Problem: NEUROLOGICAL - ADULT  Goal: Achieves stable or improved neurological status  Description: INTERVENTIONS  - Assess for and report changes in neurological status  - Initiate measures to prevent increased intracranial pressure  - Maintain blood pressure and fluid volume within ordered parameters to optimize cerebral perfusion and minimize risk of hemorrhage  - Monitor temperature, glucose, and sodium.  Initiate appropriate interventions as ordered  Outcome: Progressing  Goal: Absence of seizures  Description: INTERVENTIONS  - Monitor for seizure activity  - Administer anti-seizure medications as ordered  - Monitor neurological status  Outcome: Progressing  Goal: Remains free of injury related to seizure activity  Description: INTERVENTIONS:  - Maintain airway, patient safety  and administer oxygen as ordered  - Monitor patient for seizure activity, document and report duration and description of seizure to MD/LIP  - If seizure occurs, turn patient to side and suction secretions as needed  - Reorient patient post seizure  - Seizure pads on all 4 side rails  - Instruct patient/family to notify RN of any seizure activity  - Instruct patient/family to call for assistance with activity based on assessment  Outcome: Progressing  Goal: Achieves maximal functionality and self care  Description: INTERVENTIONS  - Monitor swallowing and airway patency with patient fatigue and changes in neurological status  - Encourage and assist patient to increase activity and self care with guidance from PT/OT  - Encourage visually impaired, hearing impaired and aphasic patients to use assistive/communication devices  Outcome: Progressing

## 2023-06-10 NOTE — DISCHARGE INSTRUCTIONS
Follow-up with pcp on 6/14 as scheduled  Check blood sugars before meals and bedtime--> call Dr. Elizabeth Colorado with these results   We are stopping Glipizide  Anticipate restarting farxiga, pioglitazone, and rybelsus per Dr. Johnson Po

## 2023-12-12 NOTE — ED QUICK NOTES
Care endorsed to [facility] Pt's dtr updated, per lincoln Calloway CNP- new script for pt for oxycodone.   Dr Anna will discuss goals of care at 12/14 FUV.   Dtr and wife in agreement with plan.

## 2024-02-19 NOTE — PROGRESS NOTES
ID: Carly Bennett  : 1955  Date: 2024     Referred by Jerson Gamez MD    Chief Complaint   Patient presents with    Incontinence     UUI and MATILDE for the past 10 years  UUI more bothersome  \" I am just sitting and urine just comes out\"       HPI:  68 year old Belarusian speaking female, G6, Vaginal deliveries x5,  x1, who presents for evaluation of urinary leaking, which is longstanding, going back 10 years.  Has insensible urine loss. \"Leaks without any warning\"  Voids every 3-4 hours during the day and nocturia is 0-1  + MATILDE  No prolapse symptoms  Denies recurrent UTIs. No recent urines on file  Has a uterus  No PMB  Currently on Tolterodine 4 mg PO daily for the past 4 years.  It was helpful until recently.  Not sexually active at this time.   Bowels are regular.     PMHx: Hypothyroid, DM (HbA1C of 6.1 on 23), CKD,  h/o NSTEMI, HTN, obesity with BMI 33      Urogynecology Summary:  Urogynecology Summary  Prolapse: No  MATILDE: Yes  Urge Incontinence: Yes (Reports more bothersome. Reports just sitting and leaks urine)  Nocturia Frequency: 1 (0 to 1 during the night.)  Frequency: Greater than 3 hours  Incomplete emptying: No  Constipation: No  Wears pad day?: 3  Wears Pad Night?: 0  Activities are limited by UI/POP?: No  Currently Sexually Active: No  Avoids sexual activity due to: Other (. No intrest)          HISTORY:  Past Medical History:   Diagnosis Date    Diabetes (HCC)     Essential hypertension     NSTEMI (non-ST elevated myocardial infarction) ([facility]) 2023    Thyroid disease       No past surgical history on file.   No family history on file.   Social History     Socioeconomic History    Marital status:    Tobacco Use    Smoking status: Every Day     Types: Cigarettes    Smokeless tobacco: Never   Substance and Sexual Activity    Alcohol use: No    Drug use: No        Allergies:  Allergies   Allergen Reactions    Ibuprofen UNKNOWN       Medications:  Outpatient  Medications Prior to Visit   Medication Sig Dispense Refill    Accu-Chek Softclix Lancets Does not apply Misc USE 1  TO CHECK GLUCOSE ONCE DAILY 100 each 0    aspirin 81 MG Oral Tab EC Take 1 tablet (81 mg total) by mouth daily. 90 tablet 1    RYBELSUS 14 MG Oral Tab       isosorbide mononitrate ER 30 MG Oral Tablet 24 Hr Take 1 tablet (30 mg total) by mouth daily. 90 tablet 1    fenofibrate micronized 67 MG Oral Cap Take 1 capsule (67 mg total) by mouth daily with breakfast. 90 capsule 1    lisinopril 20 MG Oral Tab Take 1 tablet (20 mg total) by mouth daily. 90 tablet 1    tolterodine ER 4 MG Oral Capsule SR 24 Hr Take 1 capsule (4 mg total) by mouth daily. 90 capsule 1    ticagrelor 90 MG Oral Tab Take 1 tablet (90 mg total) by mouth every 12 (twelve) hours. 180 tablet 1    metoprolol tartrate 25 MG Oral Tab Take 0.5 tablets (12.5 mg total) by mouth 2x Daily(Beta Blocker). 90 tablet 1    Glucose Blood (ACCU-CHEK GUIDE) In Vitro Strip       Omeprazole 40 MG Oral Capsule Delayed Release Take 1 capsule (40 mg total) by mouth daily.      Blood Glucose Monitoring Suppl (ACCU-CHEK GUIDE) w/Device Does not apply Kit 1 each daily. 1 kit 0    traMADol 50 MG Oral Tab 1-2 tablets every 8 hrs prn pain 60 tablet 2    gabapentin 300 MG Oral Cap Take 1 capsule (300 mg total) by mouth 3 (three) times daily. 120 capsule 3    levothyroxine 200 MCG Oral Tab Take 1 tablet (200 mcg total) by mouth before breakfast. 90 tablet 3    levothyroxine 150 MCG Oral Tab Take 1 tablet (150 mcg total) by mouth before breakfast. 90 tablet 0    rosuvastatin 40 MG Oral Tab Take 1 tablet (40 mg total) by mouth daily. 90 tablet 1    pioglitazone 45 MG Oral Tab Take 1 tablet (45 mg total) by mouth daily. 90 tablet 0    meclizine 25 MG Oral Tab Take 1 tablet (25 mg total) by mouth 3 (three) times daily as needed. 60 tablet 2     No facility-administered medications prior to visit.       Review of Systems:    A comprehensive 12 point review of systems  was completed.  Pertinent positives noted in the the HPI.  Denies CP  Denies SOB    Vitals:  Resp 18   Ht 61\"   Wt 176 lb (79.8 kg)   BMI 33.25 kg/m²        GENERAL EXAM:  GENERAL:  Alert and oriented. Well-nourished, normally developed.  Thought and emotional status are appropriate, speech is understandable.  No acute distress.   HEAD: Normocephalic and atraumatic with normal hair distribution  LUNGS:  Normal respiratory effort.    ABDOMEN: Soft, obese, non tender. Vertical midline scar present.   EXTREMITIES:  Without edema, varicosities or lesions.   SKIN:  Warm and dry, with good color and turgor. No lesions.    PELVIC EXAM:  External Genitalia: Normal appearance for age. + atrophy, no lesions  Urethra: + atrophy, non tender  Bladder:no fullness, non tender  Vagina: + atrophy, no lesions   Cervix: no bleeding, no lesions, non tender  Uterus: limited exam secondary to body habitus.   Adnexa: Unable to palpate  Perineum: non tender  Anus: no hemorrhoids  Rectum: deferred.     PELVIS FLOOR NEUROMUSCULAR FUNCTION:  Strength:  2  Perineal Sensation:  Normal      PELVIC SUPPORT:  [place]:  1  Ant:  1  Post:  1  CST:  positive  UVJ: + hypermobile    The patient voided 200 mL in the privacy of the bathroom.  She was catheterized for PVR of 30 mL.  Urine dip negative. Specimen sent for C&S.     Impression/Plan:    ICD-10-CM    1. Urge incontinence  N39.41       2. Urinary incontinence without sensory awareness  N39.42       3. Stress incontinence  N39.3       4. Class 1 drug-induced obesity with body mass index (BMI) of 33.0 to 33.9 in adult, unspecified whether serious comorbidity present  E66.1     Z68.33           Discussion Items:   Behavioral and pharmacologic treatments for OAB  Nonsurgical and surgical treatments for Stress Urinary Incontinence  Discussed dietary and behavioral modification, discussed pharmacologic and nonpharmacologic mgmt options for urinary symptoms. Discussed dietary & weight management with  potential improvements in symptoms with weight loss.    Diagnostic Items:  Urodynamics  Urine culture    Medications Discussed:  None    Treatment Plan, Non-surgical:   None    Treatment Plan, Surgical:   Await results of urodynamic testing.  The patient may be a candidate for sling, as long as she is cleared for surgery.  Otherwise, may offer different OAB meds or PT.     Pt and  verbalize understanding of all above discussed information    Follow up after testing.     Linsey George MD, FACOG, FACS  Female Pelvic Medicine and  Reconstructive Surgery (Urogynecology)  [phone] (Pager)

## 2024-02-20 ENCOUNTER — HOSPITAL ENCOUNTER (OUTPATIENT)
Dept: BONE DENSITY | Facility: HOSPITAL | Age: 69
Discharge: HOME | End: 2024-02-20
Attending: FAMILY MEDICINE
Payer: MEDICARE

## 2024-02-20 ENCOUNTER — OFFICE VISIT (OUTPATIENT)
Dept: UROLOGY | Facility: HOSPITAL | Age: 69
End: 2024-02-20
Attending: FAMILY MEDICINE
Payer: MEDICARE

## 2024-02-20 ENCOUNTER — HOSPITAL ENCOUNTER (OUTPATIENT)
Dept: MAMMOGRAPHY | Facility: HOSPITAL | Age: 69
Discharge: HOME | End: 2024-02-20
Attending: FAMILY MEDICINE
Payer: MEDICARE

## 2024-02-20 VITALS — RESPIRATION RATE: 18 BRPM | HEIGHT: 61 IN | WEIGHT: 176 LBS | BODY MASS INDEX: 33.23 KG/M2

## 2024-02-20 DIAGNOSIS — E66.1 CLASS 1 DRUG-INDUCED OBESITY WITH BODY MASS INDEX (BMI) OF 33.0 TO 33.9 IN ADULT, UNSPECIFIED WHETHER SERIOUS COMORBIDITY PRESENT: ICD-10-CM

## 2024-02-20 DIAGNOSIS — N39.41 URGE INCONTINENCE: Primary | ICD-10-CM

## 2024-02-20 DIAGNOSIS — Z78.0 MENOPAUSE: ICD-10-CM

## 2024-02-20 DIAGNOSIS — N39.42 URINARY INCONTINENCE WITHOUT SENSORY AWARENESS: ICD-10-CM

## 2024-02-20 DIAGNOSIS — Z12.31 VISIT FOR SCREENING MAMMOGRAM: ICD-10-CM

## 2024-02-20 DIAGNOSIS — N39.3 STRESS INCONTINENCE: ICD-10-CM

## 2024-02-20 PROBLEM — E66.811 CLASS 1 DRUG-INDUCED OBESITY WITH BODY MASS INDEX (BMI) OF 33.0 TO 33.9 IN ADULT: Status: ACTIVE | Noted: 2024-02-20

## 2024-02-20 LAB
BLOOD URINE: NEGATIVE
CONTROL RUN WITHIN 24 HOURS?: YES
LEUKOCYTE ESTERASE URINE: NEGATIVE
NITRITE URINE: NEGATIVE

## 2024-02-20 PROCEDURE — 87086 URINE CULTURE/COLONY COUNT: CPT | Performed by: OBSTETRICS & GYNECOLOGY

## 2024-02-20 PROCEDURE — 77063 BREAST TOMOSYNTHESIS BI: CPT | Performed by: FAMILY MEDICINE

## 2024-02-20 PROCEDURE — 99212 OFFICE O/P EST SF 10 MIN: CPT

## 2024-02-20 PROCEDURE — 81002 URINALYSIS NONAUTO W/O SCOPE: CPT | Performed by: OBSTETRICS & GYNECOLOGY

## 2024-02-20 PROCEDURE — 77080 DXA BONE DENSITY AXIAL: CPT | Performed by: FAMILY MEDICINE

## 2024-02-20 PROCEDURE — 77067 SCR MAMMO BI INCL CAD: CPT | Performed by: FAMILY MEDICINE

## 2024-02-20 PROCEDURE — 51701 INSERT BLADDER CATHETER: CPT | Performed by: OBSTETRICS & GYNECOLOGY

## 2024-02-20 NOTE — PROGRESS NOTES
[facility] provided  services used during visit. Spoke with Jackie at [phone] with the patients consent.

## 2024-03-07 ENCOUNTER — HOSPITAL ENCOUNTER (EMERGENCY)
Facility: HOSPITAL | Age: 69
Discharge: HOME | End: 2024-03-07
Attending: EMERGENCY MEDICINE
Payer: MEDICARE

## 2024-03-07 VITALS
HEIGHT: 58 IN | RESPIRATION RATE: 22 BRPM | OXYGEN SATURATION: 95 % | SYSTOLIC BLOOD PRESSURE: 115 MMHG | TEMPERATURE: 100 F | BODY MASS INDEX: 37.79 KG/M2 | WEIGHT: 180 LBS | DIASTOLIC BLOOD PRESSURE: 68 MMHG | HEART RATE: 92 BPM

## 2024-03-07 DIAGNOSIS — B34.9 VIRAL SYNDROME: Primary | ICD-10-CM

## 2024-03-07 LAB
FLUAV + FLUBV RNA SPEC NAA+PROBE: NEGATIVE
FLUAV + FLUBV RNA SPEC NAA+PROBE: NEGATIVE
RSV RNA SPEC NAA+PROBE: NEGATIVE
SARS-COV-2 RNA RESP QL NAA+PROBE: NOT DETECTED

## 2024-03-07 PROCEDURE — 99283 EMERGENCY DEPT VISIT LOW MDM: CPT

## 2024-03-07 PROCEDURE — 0241U SARS-COV-2/FLU A AND B/RSV BY PCR (GENEXPERT): CPT | Performed by: EMERGENCY MEDICINE

## 2024-03-07 RX ORDER — ACETAMINOPHEN 500 MG
1000 TABLET ORAL ONCE
Status: COMPLETED | OUTPATIENT
Start: 2024-03-07 | End: 2024-03-07

## 2024-03-07 RX ORDER — ACETAMINOPHEN 325 MG/1
650 TABLET ORAL EVERY 6 HOURS PRN
Qty: 30 TABLET | Refills: 0 | Status: SHIPPED | OUTPATIENT
Start: 2024-03-07

## 2024-03-08 NOTE — ED INITIAL ASSESSMENT (HPI)
Pt arrives ambulatory to ED for fevers, body aches, HA, and chills. Pt states sx ongoing since Tuesday. Pt states she has been taking Children's tylenol for fevers and an abx given by her PCP. Pt states she is unaware why she is taking the abx, PCP told her she has an infection. Pt states BLE pain. Aox4, speaking in full sentences.

## 2024-03-08 NOTE — ED PROVIDER NOTES
Patient Seen in: [facility] Emergency Department    History     Chief Complaint   Patient presents with    Fever    Body ache and/or chills       HPI    68-year-old female with past medical history significant for diabetes, high blood pressure, CAD, hypothyroidism, presents to the ER for evaluation of bodyaches, fever, chills, headache.  Patient's had symptoms for 3 days and states that she has had temperatures as high as 103 degrees.  No nausea, vomiting, diarrhea, dysuria, sore throat, cough.    History from Independent Source:       External Records Reviewed: Reviewed notes available in EMR.  Patient had contacted primary care physician today and they had called in Augmentin for patient.    History reviewed.   Past Medical History:   Diagnosis Date    Diabetes (HCC)     Essential hypertension     NSTEMI (non-ST elevated myocardial infarction) (HCC) 05/14/2023    Thyroid disease        History reviewed.   Past Surgical History:   Procedure Laterality Date    NEEDLE BIOPSY RIGHT Right     visible bx clip on rt breast.  pt states bx done over 15yrs ago.         Medications :  (Not in a hospital admission)       No family history on file.    Smoking Status:   Social History     Socioeconomic History    Marital status:    Tobacco Use    Smoking status: Every Day     Types: Cigarettes    Smokeless tobacco: Never   Substance and Sexual Activity    Alcohol use: No    Drug use: No       Constitutional and vital signs reviewed.      Social History and Family History elements reviewed from today, pertinent positives to the presenting problem noted.    Physical Exam     ED Triage Vitals [03/07/24 2102]   /70   Pulse 86   Resp 22   Temp (!) 102.7 °F (39.3 °C)   Temp src Oral   SpO2 96 %   O2 Device None (Room air)       Physical Exam   Constitutional: AAOx3, well nourished, NAD  HEENT: Normocephalic, PERRLA, MMM  CV: s1s2+, RRR, no m/r/g, normal distal pulses  Pulmonary/Chest: CTA b/l with no rales,  wheezes.  No chest wall tenderness  Abdominal: Nontender.  Nondistended. Soft. Bowel sounds are normal.   Neck/Back:   :   Musculoskeletal: Normal range of motion. No deformity.   Neurological: Awake, alert. Normal reflexes. No cranial nerve deficit.    Skin: Skin is warm and dry. No rash noted. No erythema.   Psychiatric:      All measures to prevent infection transmission during my interaction with the patient were taken. The patient was already wearing a droplet mask on my arrival to the room. Personal protective equipment was worn throughout the duration of the exam.      ED Course        Labs Reviewed   SARS-COV-2/FLU A AND B/RSV BY PCR (GENEXPERT) - Normal    Narrative:     This test is intended for the qualitative detection and differentiation of SARS-CoV-2, influenza A, influenza B, and respiratory syncytial virus (RSV) viral RNA in nasopharyngeal or nares swabs from individuals suspected of respiratory viral infection consistent with COVID-19 by their healthcare provider. Signs and symptoms of respiratory viral infection due to SARS-CoV-2, influenza, and RSV can be similar.                                    Test performed using the Xpert Xpress SARS-CoV-2/FLU/RSV (real time RT-PCR)  assay on the [employer] instrument, [address], [place] [address].                   This test is being used under the Food and Drug Administration's Emergency Use Authorization.                                    The authorized Fact Sheet for Healthcare Providers for this assay is available upon request from the laboratory.     My Independent Interpretation of EKG (if performed):     Imaging Results Available and Reviewed while in ED: No results found.  ED Medications Administered:   Medications   acetaminophen (Tylenol Extra Strength) tab 1,000 mg (1,000 mg Oral Given 3/7/24 2111)             MDM     Vitals:    03/07/24 2102 03/07/24 2245 03/07/24 2251   BP: 121/70 117/65    Pulse: 86 94    Resp: 22     Temp: (!) 102.7 °F  (39.3 °C)  100.1 °F (37.8 °C)   TempSrc: Oral  Oral   SpO2: 96% 96%    Weight: 81.6 kg     Height: 147.3 cm (4' 10\")       *I personally reviewed and interpreted all ED vitals.    Independent Interpretation of Studies:     Social Determinants of Health:     Procedures:      Differential/MDM/Shared Decision Making: Differential Diagnosis includes influenza, COVID, pneumonia, viral syndrome, UTI, others.      The patient already has diabetes, high blood pressure, CAD, hypothyroidism, to contribute to the complexity of this ED evaluation.           Patient has symptoms consistent with viral syndrome.  She was given Tylenol and is feeling much better at this time.  She was underdosing with children's Tylenol at home for the past few days.  I have recommended that she complete her Augmentin prescription.  She remains hemodynamically stable in the ED.  Discussed case with patient and she is comfortable with discharge plan.      Condition upon leaving the department: Stable    Disposition and Plan     Clinical Impression:  1. Viral syndrome        Disposition:  Discharge    Follow-up:  Jerson Navas MD  [address] [place], [place] [address] [phone]    Call in 2 day(s)        Medications Prescribed:  Current Discharge Medication List        START taking these medications    Details   acetaminophen 325 MG Oral Tab Take 2 tablets (650 mg total) by mouth every 6 (six) hours as needed for Pain.  Qty: 30 tablet, Refills: 0

## 2024-03-15 ENCOUNTER — TELEPHONE (OUTPATIENT)
Dept: UROLOGY | Facility: HOSPITAL | Age: 69
End: 2024-03-15

## 2024-03-15 NOTE — TELEPHONE ENCOUNTER
Outgoing call to Carly Gasca MA (pt Irish speaking) to discuss upcoming UDS procedure on Monday 3/18/24.  Pt informed that she needs to stop today 3/15/2024 taking Tolterodine 4 mg for upcoming UDS testing on Monday.  Pt verbalized understanding.

## 2024-03-18 ENCOUNTER — OFFICE VISIT (OUTPATIENT)
Dept: UROLOGY | Facility: HOSPITAL | Age: 69
End: 2024-03-18
Attending: OBSTETRICS & GYNECOLOGY
Payer: MEDICARE

## 2024-03-18 VITALS — RESPIRATION RATE: 18 BRPM | WEIGHT: 180 LBS | BODY MASS INDEX: 37.79 KG/M2 | HEIGHT: 58 IN

## 2024-03-18 DIAGNOSIS — N39.3 STRESS INCONTINENCE: ICD-10-CM

## 2024-03-18 DIAGNOSIS — N39.41 URGE INCONTINENCE: Primary | ICD-10-CM

## 2024-03-18 LAB
CONTROL RUN WITHIN 24 HOURS?: YES
LEUKOCYTE ESTERASE URINE: NEGATIVE
NITRITE URINE: NEGATIVE

## 2024-03-18 PROCEDURE — 81002 URINALYSIS NONAUTO W/O SCOPE: CPT

## 2024-03-18 PROCEDURE — 51784 ANAL/URINARY MUSCLE STUDY: CPT

## 2024-03-18 PROCEDURE — 51741 ELECTRO-UROFLOWMETRY FIRST: CPT

## 2024-03-18 PROCEDURE — 51797 INTRAABDOMINAL PRESSURE TEST: CPT

## 2024-03-18 PROCEDURE — 51729 CYSTOMETROGRAM W/VP&UP: CPT

## 2024-03-18 NOTE — PROCEDURES
Patient here with her  for urodynamic testing. Used  service during this visit. Spoke with Cheri at [phone] with the patients consent. Procedure explained and confirmed by patient.  See evaluation form for results.  Both verbal and written discharge instructions were given.  Patient tolerated procedure well and will follow up with Dr. Linsey George on 2024 @ 9:30 am.    URODYNAMIC EVALUATION    PATIENT HISTORY:    Prolapse:  No  MATILDE:  Yes  UUI:  Yes(Reports more bothersome) Reports insensible urine loss.   Nocturia:  2 (Reports 1-2 x during the night)  Frequency:  >3 hours  Sense of Incomplete Emptying:  Yes (Reports sense of incomplete emptying at times)  Constipation:  No (Bowel regimen reviewed)  Last void prior to UDS testin.5 hours   Current urge to void?  None  OAB meds stopped prior to test?  Yes Stopped taking Tolterodine 4 mg po daily on 03/15/24  Other symptoms?      Surgery?  [x]  No      Surgical folder provided?  []  Yes  [x]  No     PATIENT DIAGNOSIS:  Urge Incontinence N39.41 and Stress Incontinence N39.3    UDS PROCEDURAL FINDINGS:  Urge Incontinence N39.41 and Stress Incontinence N39.3    MEDICATION:   No outpatient medications have been marked as taking for the 3/18/24 encounter (Office Visit) with [facility] PROCEDURE.        ALLERGIES:  Ibuprofen      EXAM:  Urinalysis Dip:  Today's Results   Component Date Value    control run 2024 Yes     Blood Urine 2024 Trace (A)     Nitrite Urine 2024 Negative     Leukocyte esterase urine 2024 Negative       [place] ( >30 degrees ):  [x]  Mobile  []  Fixed    Perineal Sensation:  [x]  Normal  []  Abnormal    Additional Notes:    PROLAPSE:  []  Yes  [x]  No  Prolapse reduced for testing?  []  Yes  [x]  No  []  Pessary  []  Manual  []  Half Speculum    Additional Notes:    UROFLOWMETRY:  Unreduced    Voided Volume:                48             mL  Maximum Flow Rate:                   10             mL/sec  Average flow rate:                  4           mL/sec  Post-void Residual:                5          mL  Pattern:  []  Normal  [x]  Poor flow     [x]  Intermittent []  Other  Void:   []  Typical  []  Atypical    Additional Notes:    CYSTOMETRY:  Urethral Catheter:  Fr 7 / tdoc  Abd Catheter:     Fr 7 / tdoc   Infusion:  Water Rate 30 mL/min  Temp:  Room  Position:  [x]  Sit  []  Stand  []  Supine  First sensation:   41 mL  First desire to void:   126 mL  Strong desire to void:  225 mL  Maximum cystometric capacity:   400 mL  Detrusor Activity:  [x]  Unstable   []  Stable  Urge leakage?    []  Yes [x]  No  Volume at 1st unhibited detrusor cont:   337 mL  Detrusor instability provoked by:    [x]  Spontaneous []  Coughing  [x]  Filling  []  Valsalva  []  Other    Additional Notes:      URETHRAL FUNCTION:  Valsava (vesical) Leak Point Pressures:    Volume Leak Point Pressure Leak?    Cough Valsalva      100mL 101 78    cm H2O [x]  Yes with cough and valsalva []  No   200mL 100 78    cm H2O [x]  Yes with cough and valsalva []  No   300mL 119 86    cm H2O [x]  Yes with cough and valsalva []  No     Genuine Stress Incontinence demonstrated?   [x]  Yes @100 unreduced in the absence of an uninhibited contraction  []  No    Resting Urethral Pressure Profile:     Functional Urethral Length:         0.6 cm              0.9         cm     Maximum UCP:          57 cm                                    46                cm       PRESSURE/FLOW STUDY:  Unreduced    Voided volume:   556     mL  Maximum flow rate:       27 mL/sec  Pressure Detrusor (at maximum flow):         30   cm H2O  Post void residual:          1     mL  Voiding mechanism:  []  Abnormal  []  Normal  []  Strain to void   []  Weak detrusor  Void:   [x]  Typical   []  Atypical    Additional Notes:  Uroflowmetry, cystometry, and pressure / flow study were completed using calibrated electronic equipment and air charged transducers.    EMG:  During fill:  Reactive    During flow study: Reactive    3/18/2024 9:02 AM     PERFORMED BY:  Lucia FAIR RN

## 2024-03-18 NOTE — PATIENT INSTRUCTIONS
[facility] - [facility] UROGYNECOLOGY  [address]  [facility] [facility] [place], [place] [address]  PH: [phone]  FAX: [phone]       Urodynamic Testing Discharge Instructions:    There are NO dietary or activity restrictions.  You may resume your normal schedule.      You may have mild discomfort for a few hours after your testing today.  There may be some mild burning when you urinate or you may see some blood in your urine.  These problems should not last more than 24 hours.  The following suggestions may minimize any symptoms you experience.    Drink 6-8 large glasses of water over the next 8 hours  A compress or sitz bath may be soothing  Tylenol or Ibuprofen may be taken as needed    If you experience any of the following, please call the office or, if after hours, the on-call physician at [phone].    Excessive pain  Bright red bloody discharge  Fever or chills  Continued urgency, frequency or burning with urination    Obtaining Test Results    Your urodynamic test will be interpreted by a specialist and available to the referring physician within 7-14 days.  Patients in our clinic are given an appointment to come back to discuss the results and any appropriate treatment recommendations.    Please do not hesitate to contact our office with any questions or concerns at [phone].    I acknowledge that I have received verbal and written discharge  instructions and that I understand these instructions clearly.    Patient Signature:    Date:    [facility]   [facility]    BOWEL REGIMEN    Constipation can have detrimental effects on bladder function and can worsen the symptoms of prolapse.  It is important to avoid constipation.    The first step for treating constipation is to increase the amount of fiber in your diet.  It is usually difficult to get enough fiber each day in the food we eat.  Therefore, the best way to increase fiber is to take a daily  fiber supplement.      _____Fiber Supplement  (Metamucil, Citrucel, Benefiber, etc)    Begin with 1 dose (as instructed on the package) every morning.    If the stool is too hard, or if the stool consists of small, hard, marbles, you may need to increase to 1 dose each morning and 1 dose each evening.    If you find it difficult to take the fiber as directed (i.e. mixed with water or juice), you can mix the fiber with your cereal or with applesauce.    Don’t worry if you have to increase the amount---fiber is not addictive and will not cause diarrhea.      _____Milk of Magnesia    Begin with 1 teaspoon every evening.  This is a very low dose---approximately one-sixth of the typical dose.  You may need to increase the amount depending on your results.      _____Miralax    Miralax is a laxative available over the counter.  It can be used on a long-term basis if necessary.  The usual starting dose is 1 capful of powder mixed in fluid each morning.  The dose can be adjusted up or down depending on results and consistency of stool.[facility]    HAVING A URINARY CATHETER AFTER SURGERY    What is a urinary catheter?  A catheter is a soft tube used to drain urine from your bladder.    Why do I need a catheter?  A urinary catheter is used to help with healing immediately after surgery.  It works to keep your bladder empty while you are recovering.  Surgery, swelling and anesthesia can cause the bladder to lose its normal sensations for a while.  The catheter may stay in place for a week or more after you go home.    Where will I go to get the catheter removed?  Your catheter will be removed in the office.  Please call the office to schedule a voiding trial with the nurse.    How will the catheter be removed?  A nurse will disconnect your catheter from the drainage bag.  She will attach a syringe to the catheter and fill the bladder with sterile water until you have a strong desire to  urinate.  The catheter will be removed and you will be able to go to the bathroom to empty your bladder.    Will the catheter need to be replaced?  You will need to urinate a specific amount, depending on how much you were initially able to hold.  Your catheter will only need to be replaced if you are not able to empty the specific amount.  If the catheter is replaced, your nurse will discuss with you when you need to return.    What do I do after the catheter is removed?  For the first 24 hours, you should go to the bathroom with your first urge or every 2-3 hours while you are awake.  Urinate before you go to bed and if you wake up in the night, you do not need to set an alarm to wake up.  Drink plenty of water (6-8 glasses) slowly throughout the day.  Avoid liquids containing caffeine.  Continue with any pain medications (Motrin) as directed by the nurse.  Continue with your current bowel regime; avoid constipation.    What if I have trouble emptying my bladder?  If you are having trouble emptying your bladder, try the following tips:  Urinate normally then stand up, walk around for a minute or two, sit back down on the commode and attempt to urinate (double void).  Sit in a tub of warm water and try to urinate in the tub.  Run warm water over your vaginal area while attempting to urinate.    When should I call the office?  If you are unable to urinate for 6 hours.  You feel you need to urinate but cannot.  Urinating frequently in small amounts.  You experience abdominal bloating, pressure or back pain.  You have a fever over 100.5 for 4 hours or above 101.0 anytime.  You have nausea and/or vomiting.  Burning/pain with urination.    Please feel free to call the nurse at [phone] with any questions 8am-4:30pm Monday-Friday.    If the office is closed, you may call the on call physician at [phone] or go to the emergency room.[facility]     Post-Operative  Guidelines    AVOID CONSTIPATION - Take Miralax: one capful in water or juice each morning.  You can also take each evening if needed.  No lifting over 10 lbs. (1 gallon of milk is 8 lbs.)  It is okay to walk up and down stairs and to walk outside, but be careful not to become fatigued.  Showers and tub baths are okay, even if you have a catheter or abdominal incision.  You may ride in a car immediately.  You may drive after 1-2 weeks.    Please call us for any of the following:  Temperature above 100.5 for 4 hours or above 101.0 at any time  Chest pain or trouble breathing  Vaginal bleeding heavier than a period  Redness, tenderness or swelling of your legs  Pain or burning when you urinate  Redness, pain or foul discharge from incision

## 2024-03-25 NOTE — PROGRESS NOTES
ID: Carly Bennett  : 1955  Date: 3/26/24    Chief Complaint   Patient presents with    UDS Follow-up       HPI:  68 year old Lithuanian speaking female, G6, Vaginal deliveries x5,  x1, who was originally seen on 24.  To summarize, she presented for evaluation of urinary leaking, which is longstanding, going back 10 years.  Has insensible urine loss. \"Leaks without any warning\"  Voids every 3-4 hours during the day and nocturia is 0-1  + MATILDE  No prolapse symptoms  Denies recurrent UTIs. No recent urines on file  Has a uterus  No PMB  Currently on Tolterodine 4 mg PO daily for the past 4 years.  It was helpful until recently.  Not sexually active at this time.   Bowels are regular.     PMHx: Hypothyroid, DM (HbA1C of 6.1 on 23), CKD,  h/o NSTEMI, HTN, obesity with BMI 33    Initial exam demonstrated + UGA, mild POP, + CST, negative urine culture.  Returns after UDS.    Interval history:  Urodynamic testing undergone without complication on 3/18/24.  Results reviewed with patient  48 ml void (5 ml PVR)  400 ml capacity  + Unstable detrusor at 337 mL, resulting in urgency but no leaking.   + MATILDE with cough and Valsalva at 100 mL.  VLLP 80 cm of water.   Pressure/flow study: Voided 556 ml with PVR of 1 mL.     Still takes Detrol. Mostly bothered by insensible urine loss.     Review of Systems:    A comprehensive 12 point review of systems was completed.  Pertinent positives noted in the the HPI.  Denies CP  Denies SOB    Vitals:  Resp 18   Ht 58\"   Wt 180 lb (81.6 kg)   BMI 37.62 kg/m²      General:  Alert and oriented. Well-nourished, normally developed.  Thought and emotional status are appropriate, speech is understandable.  No acute distress.  Pelvic: deferred.     Impression:    ICD-10-CM    1. Stress incontinence  N39.3       2. Urge incontinence  N39.41       3. Urinary incontinence without sensory awareness  N39.42             Plan:  Treatment options reviewed.    Recommend to  discontinue Detrol and start Gemtesa 75 mg PO daily.  Referral to pelvic floor PT.   Follow up in 4 months or sooner prn.   Consider MUS, cysto if MATILDE symptoms bothersome.       Linsey George MD, FACOG, FACS  Female Pelvic Medicine and  Reconstructive Surgery (Urogynecology)  [phone] (Pager)

## 2024-03-26 ENCOUNTER — OFFICE VISIT (OUTPATIENT)
Dept: UROLOGY | Facility: HOSPITAL | Age: 69
End: 2024-03-26
Attending: OBSTETRICS & GYNECOLOGY
Payer: MEDICARE

## 2024-03-26 VITALS — RESPIRATION RATE: 18 BRPM | WEIGHT: 180 LBS | HEIGHT: 58 IN | BODY MASS INDEX: 37.79 KG/M2

## 2024-03-26 DIAGNOSIS — N39.41 URGE INCONTINENCE: ICD-10-CM

## 2024-03-26 DIAGNOSIS — N39.42 URINARY INCONTINENCE WITHOUT SENSORY AWARENESS: ICD-10-CM

## 2024-03-26 DIAGNOSIS — N39.3 STRESS INCONTINENCE: Primary | ICD-10-CM

## 2024-03-26 PROCEDURE — 99212 OFFICE O/P EST SF 10 MIN: CPT

## 2024-03-26 RX ORDER — VIBEGRON 75 MG/1
1 TABLET, FILM COATED ORAL DAILY
Qty: 30 TABLET | Refills: 3 | Status: SHIPPED | OUTPATIENT
Start: 2024-03-26

## 2024-07-14 DIAGNOSIS — N39.41 URGE INCONTINENCE: ICD-10-CM

## 2024-07-15 RX ORDER — VIBEGRON 75 MG/1
1 TABLET, FILM COATED ORAL DAILY
Qty: 90 TABLET | Refills: 3 | Status: SHIPPED | OUTPATIENT
Start: 2024-07-15

## 2024-07-29 NOTE — PROGRESS NOTES
ID: Carly Bennett  : 1955  Date: 24    Chief Complaint   Patient presents with    Urge Incontinence    Incontinence     Used  Ingris #[id number]       HPI:  69 year old Maori speaking female, G6, Vaginal deliveries x5,  x1, who was last seen on 3/26/24.  To summarize, she presented for evaluation of urinary leaking, which is longstanding, going back 10 years.  Has insensible urine loss. \"Leaks without any warning\"  Voids every 3-4 hours during the day and nocturia is 0-1  + MATILDE  No prolapse symptoms  Denies recurrent UTIs. No recent urines on file  Has a uterus  No PMB  Was on Tolterodine 4 mg PO daily for the past 4 years.  It was helpful until recently.  Not sexually active at this time.   Bowels are regular.     PMHx: Hypothyroid, DM (HbA1C of 6.1 on 23), CKD,  h/o NSTEMI, HTN, obesity with BMI 33    Initial exam demonstrated + UGA, mild POP, + CST, negative urine culture.      Had Urodynamic testing on 3/18/24. Had normal capacity, + Unstable detrusor at 337 mL, resulting in urgency but no leaking.  + MATILDE with cough and Valsalva at 100 mL.  VLLP 80 cm of water.   Pressure/flow study: Voided 556 ml with PVR of 1 mL.     At last visit, reported being mostly bothered by insensible urine loss. Recommended to stop Detrol and start Gemtesa 75 mg PO daily. Also referred to pelvic floor PT.  Returns for follow up.    Interval history:  Did not go to PT due to logistics.  Has been taking Gemtesa 75 mg PO daily. Reports symptom resolution insensible urine loss. Denies MATILDE or UUI.  No side effects or problems with coverage.   Had some loose stools, but attributing to Ozempic.  Better since taking daily fiber.  Has dysuria off/on (more like \"spasms\" at the end of voiding, but not all the time. Acknowledges not drinking enough water.       Review of Systems:    A comprehensive 12 point review of systems was completed.  Pertinent positives noted in the the HPI.  Denies CP  Denies  SOB    Vitals:  Resp 16   Ht 58\"   Wt 179 lb (81.2 kg)   BMI 37.41 kg/m²      General:  Alert and oriented. Well-nourished, normally developed.  Thought and emotional status are appropriate, speech is understandable.  No acute distress.  Pelvic: deferred.     The patient provided a clean catch urine specimen. Urine dip + moderate blood. Specimen sent for UA, micro, C&S.     Impression:    ICD-10-CM    1. Urge incontinence  N39.41 POCT urinalysis dipstick[43876]     Urinalysis, Routine     Urine Culture, Routine      2. Urinary incontinence without sensory awareness  N39.42       3. Dysuria  R30.0           Plan:  Continue Gemtesa 75 mg PO daily.   Will call with urine results. OK to take AZO prn.   Follow up in 1 year with PA or sooner prn.       Linsey George MD, FACOG, FACS  Female Pelvic Medicine and  Reconstructive Surgery (Urogynecology)  [phone] (Pager)

## 2024-07-30 ENCOUNTER — OFFICE VISIT (OUTPATIENT)
Dept: UROLOGY | Facility: HOSPITAL | Age: 69
End: 2024-07-30
Attending: OBSTETRICS & GYNECOLOGY
Payer: MEDICARE

## 2024-07-30 VITALS — BODY MASS INDEX: 37.57 KG/M2 | HEIGHT: 58 IN | WEIGHT: 179 LBS | RESPIRATION RATE: 16 BRPM

## 2024-07-30 DIAGNOSIS — R30.0 DYSURIA: ICD-10-CM

## 2024-07-30 DIAGNOSIS — N39.41 URGE INCONTINENCE: Primary | ICD-10-CM

## 2024-07-30 DIAGNOSIS — N39.42 URINARY INCONTINENCE WITHOUT SENSORY AWARENESS: ICD-10-CM

## 2024-07-30 PROBLEM — N39.3 STRESS INCONTINENCE: Status: RESOLVED | Noted: 2019-03-26 | Resolved: 2024-07-30

## 2024-07-30 LAB
BILIRUB UR QL: NEGATIVE
CLARITY UR: CLEAR
CONTROL RUN WITHIN 24 HOURS?: YES
GLUCOSE UR-MCNC: NORMAL MG/DL
KETONES UR-MCNC: NEGATIVE MG/DL
LEUKOCYTE ESTERASE UR QL STRIP.AUTO: NEGATIVE
LEUKOCYTE ESTERASE URINE: NEGATIVE
NITRITE UR QL STRIP.AUTO: NEGATIVE
NITRITE URINE: NEGATIVE
PH UR: 6 [PH] (ref 5–8)
PROT UR-MCNC: NEGATIVE MG/DL
SP GR UR STRIP: 1 (ref 1–1.03)
UROBILINOGEN UR STRIP-ACNC: NORMAL

## 2024-07-30 PROCEDURE — 87086 URINE CULTURE/COLONY COUNT: CPT | Performed by: OBSTETRICS & GYNECOLOGY

## 2024-07-30 PROCEDURE — 81002 URINALYSIS NONAUTO W/O SCOPE: CPT | Performed by: OBSTETRICS & GYNECOLOGY

## 2024-07-30 PROCEDURE — 87077 CULTURE AEROBIC IDENTIFY: CPT | Performed by: OBSTETRICS & GYNECOLOGY

## 2024-07-30 PROCEDURE — 81001 URINALYSIS AUTO W/SCOPE: CPT | Performed by: OBSTETRICS & GYNECOLOGY

## 2024-07-30 PROCEDURE — 99212 OFFICE O/P EST SF 10 MIN: CPT

## 2024-07-30 PROCEDURE — 87186 SC STD MICRODIL/AGAR DIL: CPT | Performed by: OBSTETRICS & GYNECOLOGY

## 2024-08-02 ENCOUNTER — TELEPHONE (OUTPATIENT)
Dept: UROLOGY | Facility: HOSPITAL | Age: 69
End: 2024-08-02

## 2024-08-02 DIAGNOSIS — R30.0 DYSURIA: Primary | ICD-10-CM

## 2024-08-02 RX ORDER — NITROFURANTOIN 25; 75 MG/1; MG/1
100 CAPSULE ORAL 2 TIMES DAILY
Qty: 14 CAPSULE | Refills: 0 | Status: SHIPPED | OUTPATIENT
Start: 2024-08-02 | End: 2024-08-09

## 2024-08-02 NOTE — TELEPHONE ENCOUNTER
Today's visit was conducted via [employer], ID#[id number], Ronit Thompson.    Telephone call to patient with test results    Ucx + for 10,000 - 50,000 CFU/ML Escherichia coli     Allergies reviewed    Discussed with MADHAVI Machado, ASIA NTF 100mg PO BID x 7 days    Will send antibiotics to pt's preferred pharmacy    Encouraged PO hydration    Encouraged to avoid bladder irritants such as caffeine, alcohol, or carbonation    All questions answered     Encouraged to call if symptoms worsen or fail to improve     Office number provided [phone]    Follow up as scheduled, sooner prn    Patient understands and agrees to plan

## 2025-07-29 ENCOUNTER — OFFICE VISIT (OUTPATIENT)
Dept: UROLOGY | Facility: HOSPITAL | Age: 70
End: 2025-07-29
Attending: PHYSICIAN ASSISTANT
Payer: MEDICARE

## 2025-07-29 VITALS — RESPIRATION RATE: 16 BRPM | BODY MASS INDEX: 33.58 KG/M2 | HEIGHT: 58 IN | WEIGHT: 160 LBS

## 2025-07-29 DIAGNOSIS — N39.41 URGE INCONTINENCE: ICD-10-CM

## 2025-07-29 DIAGNOSIS — N95.2 POSTMENOPAUSAL ATROPHIC VAGINITIS: ICD-10-CM

## 2025-07-29 DIAGNOSIS — N81.84 PELVIC MUSCLE WASTING: ICD-10-CM

## 2025-07-29 DIAGNOSIS — N32.81 DETRUSOR INSTABILITY: Primary | ICD-10-CM

## 2025-07-29 PROCEDURE — 99212 OFFICE O/P EST SF 10 MIN: CPT

## 2025-07-29 RX ORDER — VIBEGRON 75 MG/1
1 TABLET, FILM COATED ORAL DAILY
Qty: 90 TABLET | Refills: 3 | Status: SHIPPED | OUTPATIENT
Start: 2025-07-29

## 2025-07-29 RX ORDER — ESTRADIOL 0.1 MG/G
CREAM VAGINAL
Qty: 42.5 G | Refills: 3 | Status: SHIPPED | OUTPATIENT
Start: 2025-07-29

## (undated) NOTE — LETTER
Hospital Discharge Documentation  Please phone to schedule a hospital follow up appointment. From: [address] [facility]  Phone: [phone]    Patient discharged time/date: 6/10/2023 12:28 PM  Patient discharge disposition:  Home or Self Care       Discharge Summary - D/C Summary        Discharge Summary signed by Susan Pate MD at 6/10/2023 11:42 AM  Version 1 of 1      Author: Susan Pate MD Service: Hospitalist Author Type: Physician    Filed: 6/10/2023 11:42 AM Date of Service: 6/10/2023 11:29 AM Status: Signed    : Susan Pate MD (Physician)         [facility]    Discharge Summary    Yaron Mcelroy Patient Status:  Observation    1955 MRN [id number]   Location [address] [place] [address] Attending Susan Pate MD   Hosp Day # 0 PCP Mitul Varma MD     Date of Admission: 2023 Disposition: Home or Self Care     Date of Discharge: 06/10/23      Admitting Diagnosis: Syncope and collapse [R55]  Hypokalemia [E87.6]  Hypoglycemia [E16.2]    Hospital Discharge Diagnoses: Syncope    Lace+ Score: 60  59-90 High Risk  29-58 Medium Risk  0-28   Low Risk. TCM Follow-Up Recommendation:  LACE > 58:  High Risk of readmission after discharge from the hospital.      Problem List: Patient Active Problem List:     Chronic kidney disease, stage 3 (moderate)     Stress incontinence     Type 2 diabetes mellitus with stage 1 chronic kidney disease, with long-term current use of insulin (HCC)     Mixed hyperlipidemia     Essential hypertension     Hypothyroidism     Cellulitis     Corrosion of third degree of left lower leg     Hearing loss of left ear     NSTEMI (non-ST elevated myocardial infarction) ([facility])     Nausea and vomiting     Upper abdominal pain     Constipation     Gastroesophageal reflux disease     Abdominal pain, epigastric     Abnormal EKG     Syncope and collapse     Hypoglycemia     Hypokalemia      Reason for Admission:   Syncope  Hypoglycemia  NSTEMI type II  CAD  HTN  CKD II    Physical Exam:   General appearance: alert, appears stated age and cooperative  Pulmonary:  clear to auscultation bilaterally  Cardiovascular: S1, S2 normal, no murmur, click, rub or gallop, regular rate and rhythm  Abdominal: soft, non-tender; bowel sounds normal; no masses,  no organomegaly  Extremities: extremities normal, atraumatic, no cyanosis or edema  Psychiatric: calm      History of Present Illness:   Per Dr. Cobian Brenda is a(n) 74year old female with a PMH of DM, HTN, CAD who states on Tuesday she started having n/v and diarrhea and was not able to tolerate any po intake but still continued to take her diabetes medicine. Her n/v and diarrhea resolved by Wednesday. On Thursday she had a little to eat but not much. She still took her diabetes medications that day. Later in the day she felt light headed and dizzy and had a syncopal episode. She came to the ED and it was felt her syncope was due to hypoglycemia which improved so she was discharged home from ED. Around 2am this morning her blood sugar went to 44 and she felt ill so she came to the ED again. She will be admitted.     Hospital Course:     Syncope and collapse  Persistent hypoglycemia  - likely due to hypoglycemia   -[facility] endo eval  - monitor on tele  - required iv dextrose, dc drip this am  - plan home off meds, plan to check blood sugars ac/hs results to pcp on on 6/12  Has follow-up appt on 6/14 plan to restart farxiga, pioglitazone and rybelsus as op  -stop glipizide on dc        NSTEMI type 2  - cards consulted  - monitor on tele  -troponins trending down  -cont  Medical management     Hypokalemia  - replace per protocol     DM  hba1c on 5/25 was 6.8  - accuchecks  - cont to hold all meds  -stop glipizide  -anticipate restarting farxiga, pioglitazone and rybelsus     HTN  - resume home meds, monitor vitals     CKD stage 2  - renal function has been stable  Consultations: endocrinology    Procedures: n/a    Complications: n/a    Discharge Condition: Good    Discharge Medications:      Discharge Medications        CONTINUE taking these medications        Instructions Prescription details   Accu-Chek Guide Strp      CHECK BS QD   Quantity: 100 strip  Refills: 3     Accu-Chek Guide w/Device Kit      1 each daily. Quantity: 1 kit  Refills: 0     Accu-Chek Multiclix Lancets Misc      CHECK BS QD   Quantity: 100 each  Refills: 3     acetaminophen 325 MG Tabs  Commonly known as: Tylenol      Take 2 tablets (650 mg total) by mouth every 6 (six) hours as needed for Pain. Quantity: 30 tablet  Refills: 0     aspirin 81 MG Tbec      Take 1 tablet (81 mg total) by mouth daily. Quantity: 30 tablet  Refills: 3     fenofibrate micronized 67 MG Caps  Commonly known as: [place]      Take 1 capsule (67 mg total) by mouth daily with breakfast.   Stop taking on: July 10, 2023  Quantity: 30 capsule  Refills: 0     gabapentin 300 MG Caps  Commonly known as: Neurontin      Take 1 capsule (300 mg total) by mouth 3 (three) times daily. Quantity: 120 capsule  Refills: 3     guaiFENesin-codeine 100-10 MG/5ML Soln  Commonly known as: Cheratussin AC      Take 5 mL by mouth every 6 (six) hours as needed for cough. Quantity: 236 mL  Refills: 0     isosorbide mononitrate ER 30 MG Tb24  Commonly known as: Imdur      Take 1 tablet (30 mg total) by mouth daily. Quantity: 30 tablet  Refills: 3     levothyroxine 200 MCG Tabs  Commonly known as: Synthroid      Take 1 tablet (200 mcg total) by mouth before breakfast.   Quantity: 90 tablet  Refills: 3     lisinopril 20 MG Tabs  Commonly known as: Prinivil; Zestril      Take 1 tablet (20 mg total) by mouth daily. Quantity: 30 tablet  Refills: 3     meclizine 25 MG Tabs  Commonly known as: Antivert      Take 1 tablet (25 mg total) by mouth 3 (three) times daily as needed.    Quantity: 60 tablet  Refills: 2     metoprolol tartrate 25 MG Tabs  Commonly known as: Lopressor      Take 0.5 tablets (12.5 mg total) by mouth 2x Daily(Beta Blocker). Quantity: 60 tablet  Refills: 3     ondansetron 8 MG tablet  Commonly known as: Zofran      Take 1 tablet (8 mg total) by mouth every 8 (eight) hours as needed for Nausea. Quantity: 30 tablet  Refills: 1     pantoprazole 40 MG Tbec  Commonly known as: Protonix      Take 1 tablet (40 mg total) by mouth 2 (two) times daily before meals. Quantity: 60 tablet  Refills: 0     rosuvastatin 40 MG Tabs  Commonly known as: Crestor      Take 1 tablet (40 mg total) by mouth nightly. Quantity: 30 tablet  Refills: 3     ticagrelor 90 MG Tabs  Commonly known as: Brilinta      Take 1 tablet (90 mg total) by mouth every 12 (twelve) hours. Quantity: 60 tablet  Refills: 3     tolterodine ER 4 MG Cp24  Commonly known as: Detrol LA      Take 1 capsule (4 mg total) by mouth daily. Quantity: 90 capsule  Refills: 1     traMADol 50 MG Tabs  Commonly known as: Ultram      1-2 tablets every 8 hrs prn pain   Quantity: 60 tablet  Refills: 2            STOP taking these medications      amoxicillin clavulanate 875-125 MG Tabs  Commonly known as: Augmentin        Farxiga 10 MG Tabs  Generic drug: dapagliflozin        glipiZIDE ER 5 MG Tb24  Commonly known as: Glucotrol XL        pioglitazone 45 MG Tabs  Commonly known as: Actos        Rybelsus 14 MG Tabs  Generic drug: Semaglutide        sucralfate 1 GM/10ML Susp  Commonly known as: Carafate                  Where to Get Your Medications        These medications were sent to [address], [address] [facility]  [address] [phone], [phone]  [address], [place] [address]      Phone: [phone]   fenofibrate micronized 79 MG Caps         Follow up Visits:  Follow-up with pcp in 1 week    Follow up Labs: accuchecks    Other Discharge Instructions: follow-up with pcp to resume oral diabetic medications      Danielle Augustine MD  6/10/2023  11:29 AM    > 35 min Electronically signed by Germaine Tran MD on 6/10/2023 11:42 AM